# Patient Record
Sex: MALE | Race: WHITE | NOT HISPANIC OR LATINO | Employment: OTHER | URBAN - METROPOLITAN AREA
[De-identification: names, ages, dates, MRNs, and addresses within clinical notes are randomized per-mention and may not be internally consistent; named-entity substitution may affect disease eponyms.]

---

## 2021-11-23 ENCOUNTER — HOSPITAL ENCOUNTER (INPATIENT)
Facility: CLINIC | Age: 86
LOS: 2 days | Discharge: HOME | DRG: 137 | End: 2021-11-25
Attending: EMERGENCY MEDICINE | Admitting: STUDENT IN AN ORGANIZED HEALTH CARE EDUCATION/TRAINING PROGRAM
Payer: COMMERCIAL

## 2021-11-23 DIAGNOSIS — L02.11 ABSCESS OF NECK: Primary | ICD-10-CM

## 2021-11-23 DIAGNOSIS — Z20.822 COVID-19 RULED OUT BY LABORATORY TESTING: ICD-10-CM

## 2021-11-23 DIAGNOSIS — R22.0 MOUTH SWELLING: ICD-10-CM

## 2021-11-23 DIAGNOSIS — R22.0 MANDIBULAR SWELLING: ICD-10-CM

## 2021-11-23 LAB
ALBUMIN SERPL-MCNC: 3 G/DL (ref 3.4–5)
ALP SERPL-CCNC: 68 U/L (ref 40–150)
ALT SERPL W P-5'-P-CCNC: 10 U/L (ref 0–70)
ANION GAP SERPL CALCULATED.3IONS-SCNC: 9 MMOL/L (ref 3–14)
AST SERPL W P-5'-P-CCNC: 12 U/L (ref 0–45)
BASOPHILS # BLD AUTO: 0.1 10E3/UL (ref 0–0.2)
BASOPHILS NFR BLD AUTO: 1 %
BILIRUB SERPL-MCNC: 0.7 MG/DL (ref 0.2–1.3)
BUN SERPL-MCNC: 23 MG/DL (ref 7–30)
CALCIUM SERPL-MCNC: 9.2 MG/DL (ref 8.5–10.1)
CHLORIDE BLD-SCNC: 103 MMOL/L (ref 94–109)
CO2 SERPL-SCNC: 25 MMOL/L (ref 20–32)
CREAT SERPL-MCNC: 0.93 MG/DL (ref 0.66–1.25)
EOSINOPHIL # BLD AUTO: 0.1 10E3/UL (ref 0–0.7)
EOSINOPHIL NFR BLD AUTO: 1 %
ERYTHROCYTE [DISTWIDTH] IN BLOOD BY AUTOMATED COUNT: 13.6 % (ref 10–15)
GFR SERPL CREATININE-BSD FRML MDRD: 71 ML/MIN/1.73M2
GLUCOSE BLD-MCNC: 196 MG/DL (ref 70–99)
GLUCOSE BLDC GLUCOMTR-MCNC: 156 MG/DL (ref 70–99)
HBA1C MFR BLD: 6.7 % (ref 0–5.6)
HCT VFR BLD AUTO: 33.9 % (ref 40–53)
HGB BLD-MCNC: 11.1 G/DL (ref 13.3–17.7)
IMM GRANULOCYTES # BLD: 0.1 10E3/UL
IMM GRANULOCYTES NFR BLD: 1 %
INR PPP: 2.57 (ref 0.85–1.15)
LYMPHOCYTES # BLD AUTO: 1 10E3/UL (ref 0.8–5.3)
LYMPHOCYTES NFR BLD AUTO: 7 %
MCH RBC QN AUTO: 30.2 PG (ref 26.5–33)
MCHC RBC AUTO-ENTMCNC: 32.7 G/DL (ref 31.5–36.5)
MCV RBC AUTO: 92 FL (ref 78–100)
MONOCYTES # BLD AUTO: 1 10E3/UL (ref 0–1.3)
MONOCYTES NFR BLD AUTO: 7 %
NEUTROPHILS # BLD AUTO: 11.8 10E3/UL (ref 1.6–8.3)
NEUTROPHILS NFR BLD AUTO: 83 %
NRBC # BLD AUTO: 0 10E3/UL
NRBC BLD AUTO-RTO: 0 /100
PLATELET # BLD AUTO: 353 10E3/UL (ref 150–450)
POTASSIUM BLD-SCNC: 4.2 MMOL/L (ref 3.4–5.3)
PROT SERPL-MCNC: 7.1 G/DL (ref 6.8–8.8)
RBC # BLD AUTO: 3.68 10E6/UL (ref 4.4–5.9)
SARS-COV-2 RNA RESP QL NAA+PROBE: NEGATIVE
SODIUM SERPL-SCNC: 137 MMOL/L (ref 133–144)
WBC # BLD AUTO: 14.1 10E3/UL (ref 4–11)

## 2021-11-23 PROCEDURE — 82040 ASSAY OF SERUM ALBUMIN: CPT

## 2021-11-23 PROCEDURE — 36415 COLL VENOUS BLD VENIPUNCTURE: CPT

## 2021-11-23 PROCEDURE — 99221 1ST HOSP IP/OBS SF/LOW 40: CPT | Mod: AI | Performed by: STUDENT IN AN ORGANIZED HEALTH CARE EDUCATION/TRAINING PROGRAM

## 2021-11-23 PROCEDURE — C9803 HOPD COVID-19 SPEC COLLECT: HCPCS | Performed by: EMERGENCY MEDICINE

## 2021-11-23 PROCEDURE — 250N000011 HC RX IP 250 OP 636

## 2021-11-23 PROCEDURE — 85610 PROTHROMBIN TIME: CPT

## 2021-11-23 PROCEDURE — 96376 TX/PRO/DX INJ SAME DRUG ADON: CPT | Performed by: EMERGENCY MEDICINE

## 2021-11-23 PROCEDURE — 120N000002 HC R&B MED SURG/OB UMMC

## 2021-11-23 PROCEDURE — 99285 EMERGENCY DEPT VISIT HI MDM: CPT | Mod: 25 | Performed by: EMERGENCY MEDICINE

## 2021-11-23 PROCEDURE — 85025 COMPLETE CBC W/AUTO DIFF WBC: CPT

## 2021-11-23 PROCEDURE — 99285 EMERGENCY DEPT VISIT HI MDM: CPT | Performed by: EMERGENCY MEDICINE

## 2021-11-23 PROCEDURE — U0005 INFEC AGEN DETEC AMPLI PROBE: HCPCS | Performed by: EMERGENCY MEDICINE

## 2021-11-23 PROCEDURE — 83036 HEMOGLOBIN GLYCOSYLATED A1C: CPT

## 2021-11-23 PROCEDURE — 87040 BLOOD CULTURE FOR BACTERIA: CPT

## 2021-11-23 PROCEDURE — 99207 PR CDG-HISTORY COMP: MEETS EXP. PROBLEM FOCUSED-DOWN CODED LACK OF ROS: CPT | Performed by: STUDENT IN AN ORGANIZED HEALTH CARE EDUCATION/TRAINING PROGRAM

## 2021-11-23 RX ORDER — POLYETHYLENE GLYCOL 3350 17 G/17G
17 POWDER, FOR SOLUTION ORAL DAILY PRN
Status: DISCONTINUED | OUTPATIENT
Start: 2021-11-23 | End: 2021-11-25 | Stop reason: HOSPADM

## 2021-11-23 RX ORDER — ASPIRIN 81 MG/1
81 TABLET ORAL DAILY
COMMUNITY

## 2021-11-23 RX ORDER — AMOXICILLIN 250 MG
1 CAPSULE ORAL 2 TIMES DAILY PRN
Status: DISCONTINUED | OUTPATIENT
Start: 2021-11-23 | End: 2021-11-25 | Stop reason: HOSPADM

## 2021-11-23 RX ORDER — AMPICILLIN AND SULBACTAM 2; 1 G/1; G/1
3 INJECTION, POWDER, FOR SOLUTION INTRAMUSCULAR; INTRAVENOUS EVERY 6 HOURS
Status: DISCONTINUED | OUTPATIENT
Start: 2021-11-23 | End: 2021-11-25

## 2021-11-23 RX ORDER — WARFARIN SODIUM 5 MG/1
TABLET ORAL
COMMUNITY
Start: 2021-09-07

## 2021-11-23 RX ORDER — ACETAMINOPHEN 325 MG/1
650 TABLET ORAL EVERY 6 HOURS PRN
Status: DISCONTINUED | OUTPATIENT
Start: 2021-11-23 | End: 2021-11-25 | Stop reason: HOSPADM

## 2021-11-23 RX ORDER — METOPROLOL SUCCINATE 50 MG/1
50 TABLET, EXTENDED RELEASE ORAL DAILY
Status: DISCONTINUED | OUTPATIENT
Start: 2021-11-24 | End: 2021-11-25 | Stop reason: HOSPADM

## 2021-11-23 RX ORDER — METOPROLOL SUCCINATE 100 MG/1
50 TABLET, EXTENDED RELEASE ORAL DAILY
COMMUNITY
Start: 2021-11-02

## 2021-11-23 RX ORDER — NICOTINE POLACRILEX 4 MG
15-30 LOZENGE BUCCAL
Status: DISCONTINUED | OUTPATIENT
Start: 2021-11-23 | End: 2021-11-25 | Stop reason: HOSPADM

## 2021-11-23 RX ORDER — SIMVASTATIN 40 MG
TABLET ORAL
COMMUNITY
Start: 2021-10-11

## 2021-11-23 RX ORDER — LIDOCAINE 40 MG/G
CREAM TOPICAL
Status: DISCONTINUED | OUTPATIENT
Start: 2021-11-23 | End: 2021-11-25 | Stop reason: HOSPADM

## 2021-11-23 RX ORDER — SIMVASTATIN 10 MG
40 TABLET ORAL AT BEDTIME
Status: DISCONTINUED | OUTPATIENT
Start: 2021-11-23 | End: 2021-11-25 | Stop reason: HOSPADM

## 2021-11-23 RX ORDER — AMOXICILLIN 250 MG
2 CAPSULE ORAL 2 TIMES DAILY PRN
Status: DISCONTINUED | OUTPATIENT
Start: 2021-11-23 | End: 2021-11-25 | Stop reason: HOSPADM

## 2021-11-23 RX ORDER — DEXTROSE MONOHYDRATE 25 G/50ML
25-50 INJECTION, SOLUTION INTRAVENOUS
Status: DISCONTINUED | OUTPATIENT
Start: 2021-11-23 | End: 2021-11-25 | Stop reason: HOSPADM

## 2021-11-23 RX ORDER — GLYBURIDE 5 MG/1
TABLET ORAL
COMMUNITY
Start: 2021-08-23

## 2021-11-23 RX ADMIN — AMPICILLIN SODIUM AND SULBACTAM SODIUM 3 G: 2; 1 INJECTION, POWDER, FOR SOLUTION INTRAMUSCULAR; INTRAVENOUS at 20:19

## 2021-11-23 ASSESSMENT — ACTIVITIES OF DAILY LIVING (ADL)
ADLS_ACUITY_SCORE: 9

## 2021-11-23 ASSESSMENT — ENCOUNTER SYMPTOMS
FEVER: 0
TROUBLE SWALLOWING: 0
SHORTNESS OF BREATH: 0

## 2021-11-23 NOTE — ED PROVIDER NOTES
[place] EMERGENCY DEPARTMENT ([facility])  11/23/21  History     Chief Complaint   Patient presents with     Facial Swelling     The history is provided by the patient and medical records.     Frank Doss is a 89 year old male with a past medical history significant for type 2 diabetes mellitus, paroxysmal atrial fibrillation (anticoagulated on Coumadin), CAD (s/p CABG x3-11/1999, s/p PTCA/stent of the RCA-2/2013), hyperlipidemia, hypertension, and prostate cancer who presents to the Emergency Department via transfer from [facility] for evaluation of submandibular oral swelling with subsequent imaging suspicious for mandibular osteomyelitis.  Patient was initially evaluated at [facility] ED earlier today (11/23) for ongoing submandibular swelling.  This visit is summarized below.  Upon ED presentation, patient reports that the swelling has been ongoing for approximately 1 month.  He denies any difficulty with breathing or swallowing.  Patient states that the area of swelling is painful to touch.  Patient denies fevers here in the ED.    Per review of the patient's medical record, he was initially evaluated earlier today at [facility] ED. He subsequently underwent a CT scan of the neck which was concerning for mandibular osteomyelitis with adjacent soft tissue swelling.  Full impression noted below.  Patient was subsequently transferred to the [place] ED for oral surgery evaluation.      Past Medical History  No past medical history on file.  No past surgical history on file.  aspirin 81 MG EC tablet  glyBURIDE (DIABETA /MICRONASE) 5 MG tablet  metFORMIN (GLUCOPHAGE) 500 MG tablet  metoprolol succinate ER (TOPROL-XL) 100 MG 24 hr tablet  simvastatin (ZOCOR) 40 MG tablet  vitamin B-12 (CYANOCOBALAMIN) 1000 MCG tablet  warfarin ANTICOAGULANT (COUMADIN) 5 MG tablet      No Known Allergies  Family History  No family history on file.  Social History   Social History     Tobacco Use     Smoking status: Not on file      Smokeless tobacco: Not on file   Substance Use Topics     Alcohol use: Not on file     Drug use: Not on file      Past medical history, past surgical history, medications, allergies, family history, and social history were reviewed with the patient. No additional pertinent items.     I have reviewed the Medications, Allergies, Past Medical and Surgical History, and Social History in the Epic system.    Review of Systems   Constitutional: Negative for fever.   HENT: Positive for dental problem (Swelling under tongue). Negative for trouble swallowing.    Respiratory: Negative for shortness of breath.    All other systems reviewed and are negative.      Physical Exam   BP: (!) 184/76  Pulse: 84  Temp: 98.5  F (36.9  C)  Resp: 18  Weight: 68 kg (150 lb)  SpO2: 95 %      Physical Exam  Vitals and nursing note reviewed.   Constitutional:       General: He is not in acute distress.     Appearance: Normal appearance. He is well-developed. He is not diaphoretic.   HENT:      Head: Normocephalic and atraumatic.      Nose: Nose normal.      Mouth/Throat:      Mouth: Mucous membranes are moist.      Pharynx: Oropharynx is clear. No oropharyngeal exudate.      Comments: Sublingual edema with mild tongue proptosis. Submental fullness and tenderness w/o increased warmth or redness.   Eyes:      General: No scleral icterus.     Conjunctiva/sclera: Conjunctivae normal.   Neck:      Trachea: Trachea and phonation normal.     Cardiovascular:      Rate and Rhythm: Normal rate.   Pulmonary:      Effort: Pulmonary effort is normal. No respiratory distress.      Breath sounds: No stridor.   Abdominal:      General: There is no distension.   Musculoskeletal:         General: No deformity or signs of injury. Normal range of motion.      Cervical back: Normal range of motion and neck supple. Tenderness present. No rigidity or crepitus. No pain with movement. Normal range of motion.   Lymphadenopathy:      Cervical: Cervical adenopathy  present.   Skin:     General: Skin is warm and dry.      Coloration: Skin is not jaundiced or pale.      Findings: No rash.   Neurological:      General: No focal deficit present.      Mental Status: He is alert and oriented to person, place, and time.   Psychiatric:         Mood and Affect: Mood normal.         Behavior: Behavior normal.         Thought Content: Thought content normal.         ED Course     At 1:12 PM the patient was seen and examined by Neda Ramirez MD in Room EDW.    CT neck soft tissue ([facility])-11/23/2021  IMPRESSION:   1. Midline posterior mandibular symphyseal lesion with lytic bone destruction, extension to the periodontal spaces as above, adjacent soft tissue swelling and evidence of bone sequestrum. 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 Moderate thickening of the digastric musculature bilaterally right side more so than left and evidence of phlegmon or very early, small intramuscular abscess involving the right digastric muscle. No airway compromise. Reactive adenopathy.      Procedures              Results for orders placed or performed during the hospital encounter of 11/23/21 (from the past 24 hour(s))   Glucose by meter   Result Value Ref Range    GLUCOSE BY METER POCT 156 (H) 70 - 99 mg/dL   Asymptomatic COVID-19 Virus (Coronavirus) by PCR Nasopharyngeal    Specimen: Nasopharyngeal; Swab   Result Value Ref Range    SARS CoV2 PCR Negative Negative, Testing sent to reference lab. Results will be returned via unsolicited result    Narrative    Testing was performed using the Xpert Xpress SARS-CoV-2 Assay on the  Cepheid Gene-Xpert Instrument Systems. Additional information about  this Emergency Use Authorization (EUA) assay can be found via the Lab  Guide. This test should be ordered for the detection of SARS-CoV-2 in  individuals who meet SARS-CoV-2 clinical and/or epidemiological  criteria. Test performance is unknown in  asymptomatic patients. This  test is for in vitro diagnostic use under the FDA EUA for  laboratories certified under CLIA to perform high complexity testing.  This test has not been FDA cleared or approved. A negative result  does not rule out the presence of PCR inhibitors in the specimen or  target RNA in concentration below the limit of detection for the  assay. The possibility of a false negative should be considered if  the patient's recent exposure or clinical presentation suggests  COVID-19. This test was validated by the [facility]. This laboratory is certified under  the Clinical Laboratory Improvement Amendments of 1988 (CLIA-88) as  qualified to perform high complexity laboratory testing.       Medications   ampicillin-sulbactam (UNASYN) 3 g vial to attach to  mL bag (has no administration in time range)   lidocaine 1 % 0.1-1 mL (has no administration in time range)   lidocaine (LMX4) cream (has no administration in time range)   sodium chloride (PF) 0.9% PF flush 3 mL (has no administration in time range)   sodium chloride (PF) 0.9% PF flush 3 mL (has no administration in time range)   melatonin tablet 1 mg (has no administration in time range)   acetaminophen (TYLENOL) tablet 650 mg (has no administration in time range)   senna-docusate (SENOKOT-S/PERICOLACE) 8.6-50 MG per tablet 1 tablet (has no administration in time range)     Or   senna-docusate (SENOKOT-S/PERICOLACE) 8.6-50 MG per tablet 2 tablet (has no administration in time range)   polyethylene glycol (MIRALAX) Packet 17 g (has no administration in time range)          Assessments & Plan (with Medical Decision Making)   Frank Doss is a 89 year old male with a past medical history significant for type 2 diabetes mellitus, paroxysmal atrial fibrillation (anticoagulated on Coumadin), CAD (s/p CABG x3-11/1999, s/p PTCA/stent of the RCA-2/2013), hyperlipidemia, hypertension, and prostate  cancer who presents to the Emergency Department via transfer from [facility] for evaluation of submandibular oral swelling with subsequent imaging suspicious for mandibular osteomyelitis.    Ddx: neck soft tissue phlegmon, mandibular osteomyelitis, ludwigs angina, malignancy    Patient well appearing on arrival. No airway obstruction. Denies pain. Reviewed OSH labs and imaging. WBC 13.1, hgb 10.9. BMP wnl. Oral surgery consulted and recommended admission to medicine. Continue Unasyn which patient got a dose of PTA. NPO at midnight with plan for OR in AM. Patient admitted to medicine.      I have reviewed the nursing notes.    I have reviewed the findings, diagnosis, plan and need for follow up with the patient.    New Prescriptions    No medications on file       Final diagnoses:   Mouth swelling       I, Rodrigo Becerril am serving as a trained medical scribe to document services personally performed by Neda Ramirez MD, based on the provider's statements to me.      INeda MD, was physically present and have reviewed and verified the accuracy of this note documented by Rodrigo Becerril.     Neda Ramirez MD  11/23/2021   [facility] EMERGENCY DEPARTMENT     Neda Ramirez MD  11/23/21 1949

## 2021-11-23 NOTE — ED NOTES
Bed: IN03  Expected date: 11/23/21  Expected time:   Means of arrival:   Comments:  Frank Douglassswell from [facility] ER with submandibular and jaw swelling. Airway stable. Vss CT with mandibular osteomyellitis and dental and peridental involvement with ? Early abscess right digastric muscle. Needs OMFS to see     Mejia Cm MD  11/23/21 [id number]

## 2021-11-23 NOTE — ED NOTES
Bed: [facility]  Expected date:   Expected time:   Means of arrival:   Comments:  CIARA [id number]  [facility] transfer  Mandibular / Mandible swelling; patent airway

## 2021-11-23 NOTE — ED TRIAGE NOTES
Arrives from [facility] via EMS. EMS states jaw and throat swelling. Non-tramatic. Ems unsure of mechanism.       20 right hand.     VSS en route. HR vamsi.     Antibiotics at 1030 this am.

## 2021-11-24 ENCOUNTER — ANESTHESIA EVENT (OUTPATIENT)
Dept: SURGERY | Facility: CLINIC | Age: 86
DRG: 137 | End: 2021-11-24
Payer: COMMERCIAL

## 2021-11-24 ENCOUNTER — ANESTHESIA (OUTPATIENT)
Dept: SURGERY | Facility: CLINIC | Age: 86
DRG: 137 | End: 2021-11-24
Payer: COMMERCIAL

## 2021-11-24 LAB
ALBUMIN SERPL-MCNC: 2.7 G/DL (ref 3.4–5)
ALP SERPL-CCNC: 64 U/L (ref 40–150)
ALT SERPL W P-5'-P-CCNC: 10 U/L (ref 0–70)
ANION GAP SERPL CALCULATED.3IONS-SCNC: 6 MMOL/L (ref 3–14)
AST SERPL W P-5'-P-CCNC: 8 U/L (ref 0–45)
ATRIAL RATE - MUSE: 61 BPM
BILIRUB SERPL-MCNC: 0.8 MG/DL (ref 0.2–1.3)
BUN SERPL-MCNC: 20 MG/DL (ref 7–30)
CALCIUM SERPL-MCNC: 9.2 MG/DL (ref 8.5–10.1)
CHLORIDE BLD-SCNC: 105 MMOL/L (ref 94–109)
CO2 SERPL-SCNC: 27 MMOL/L (ref 20–32)
CREAT SERPL-MCNC: 0.89 MG/DL (ref 0.66–1.25)
DIASTOLIC BLOOD PRESSURE - MUSE: NORMAL MMHG
ERYTHROCYTE [DISTWIDTH] IN BLOOD BY AUTOMATED COUNT: 13.6 % (ref 10–15)
GFR SERPL CREATININE-BSD FRML MDRD: 74 ML/MIN/1.73M2
GLUCOSE BLD-MCNC: 182 MG/DL (ref 70–99)
GLUCOSE BLDC GLUCOMTR-MCNC: 120 MG/DL (ref 70–99)
GLUCOSE BLDC GLUCOMTR-MCNC: 138 MG/DL (ref 70–99)
GLUCOSE BLDC GLUCOMTR-MCNC: 165 MG/DL (ref 70–99)
GLUCOSE BLDC GLUCOMTR-MCNC: 183 MG/DL (ref 70–99)
GLUCOSE BLDC GLUCOMTR-MCNC: 190 MG/DL (ref 70–99)
HCT VFR BLD AUTO: 32.2 % (ref 40–53)
HGB BLD-MCNC: 10.6 G/DL (ref 13.3–17.7)
INR PPP: 2.83 (ref 0.85–1.15)
INR PPP: 2.98 (ref 0.85–1.15)
INTERPRETATION ECG - MUSE: NORMAL
MCH RBC QN AUTO: 30.4 PG (ref 26.5–33)
MCHC RBC AUTO-ENTMCNC: 32.9 G/DL (ref 31.5–36.5)
MCV RBC AUTO: 92 FL (ref 78–100)
P AXIS - MUSE: NORMAL DEGREES
PLATELET # BLD AUTO: 340 10E3/UL (ref 150–450)
POTASSIUM BLD-SCNC: 4 MMOL/L (ref 3.4–5.3)
PR INTERVAL - MUSE: NORMAL MS
PROT SERPL-MCNC: 6.6 G/DL (ref 6.8–8.8)
QRS DURATION - MUSE: 126 MS
QT - MUSE: 442 MS
QTC - MUSE: 500 MS
R AXIS - MUSE: 22 DEGREES
RBC # BLD AUTO: 3.49 10E6/UL (ref 4.4–5.9)
SODIUM SERPL-SCNC: 138 MMOL/L (ref 133–144)
SYSTOLIC BLOOD PRESSURE - MUSE: NORMAL MMHG
T AXIS - MUSE: -37 DEGREES
VENTRICULAR RATE- MUSE: 77 BPM
WBC # BLD AUTO: 11.2 10E3/UL (ref 4–11)

## 2021-11-24 PROCEDURE — 999N000141 HC STATISTIC PRE-PROCEDURE NURSING ASSESSMENT: Performed by: DENTIST

## 2021-11-24 PROCEDURE — 36415 COLL VENOUS BLD VENIPUNCTURE: CPT

## 2021-11-24 PROCEDURE — 250N000011 HC RX IP 250 OP 636

## 2021-11-24 PROCEDURE — 0NBT0ZX EXCISION OF RIGHT MANDIBLE, OPEN APPROACH, DIAGNOSTIC: ICD-10-PCS | Performed by: DENTIST

## 2021-11-24 PROCEDURE — 87070 CULTURE OTHR SPECIMN AEROBIC: CPT | Performed by: DENTIST

## 2021-11-24 PROCEDURE — 96365 THER/PROPH/DIAG IV INF INIT: CPT | Performed by: EMERGENCY MEDICINE

## 2021-11-24 PROCEDURE — 87075 CULTR BACTERIA EXCEPT BLOOD: CPT | Performed by: DENTIST

## 2021-11-24 PROCEDURE — 250N000011 HC RX IP 250 OP 636: Performed by: ANESTHESIOLOGY

## 2021-11-24 PROCEDURE — 999N000147 HC STATISTIC PT IP EVAL DEFER

## 2021-11-24 PROCEDURE — 710N000010 HC RECOVERY PHASE 1, LEVEL 2, PER MIN: Performed by: DENTIST

## 2021-11-24 PROCEDURE — 88305 TISSUE EXAM BY PATHOLOGIST: CPT | Mod: 26 | Performed by: PATHOLOGY

## 2021-11-24 PROCEDURE — 250N000025 HC SEVOFLURANE, PER MIN: Performed by: DENTIST

## 2021-11-24 PROCEDURE — 258N000003 HC RX IP 258 OP 636: Performed by: ANESTHESIOLOGY

## 2021-11-24 PROCEDURE — 85610 PROTHROMBIN TIME: CPT | Performed by: STUDENT IN AN ORGANIZED HEALTH CARE EDUCATION/TRAINING PROGRAM

## 2021-11-24 PROCEDURE — 360N000075 HC SURGERY LEVEL 2, PER MIN: Performed by: DENTIST

## 2021-11-24 PROCEDURE — 85027 COMPLETE CBC AUTOMATED: CPT

## 2021-11-24 PROCEDURE — 370N000017 HC ANESTHESIA TECHNICAL FEE, PER MIN: Performed by: DENTIST

## 2021-11-24 PROCEDURE — 99233 SBSQ HOSP IP/OBS HIGH 50: CPT | Mod: GC | Performed by: STUDENT IN AN ORGANIZED HEALTH CARE EDUCATION/TRAINING PROGRAM

## 2021-11-24 PROCEDURE — 250N000009 HC RX 250: Performed by: ANESTHESIOLOGY

## 2021-11-24 PROCEDURE — 85610 PROTHROMBIN TIME: CPT

## 2021-11-24 PROCEDURE — 272N000001 HC OR GENERAL SUPPLY STERILE: Performed by: DENTIST

## 2021-11-24 PROCEDURE — 82040 ASSAY OF SERUM ALBUMIN: CPT

## 2021-11-24 PROCEDURE — 88305 TISSUE EXAM BY PATHOLOGIST: CPT | Mod: TC | Performed by: DENTIST

## 2021-11-24 PROCEDURE — 0CTX0Z1 RESECTION OF LOWER TOOTH, MULTIPLE, OPEN APPROACH: ICD-10-PCS | Performed by: DENTIST

## 2021-11-24 PROCEDURE — 99207 PR CDG-CUT & PASTE-POTENTIAL IMPACT ON LEVEL: CPT | Performed by: STUDENT IN AN ORGANIZED HEALTH CARE EDUCATION/TRAINING PROGRAM

## 2021-11-24 PROCEDURE — 250N000009 HC RX 250: Performed by: DENTIST

## 2021-11-24 PROCEDURE — 0NBV0ZX EXCISION OF LEFT MANDIBLE, OPEN APPROACH, DIAGNOSTIC: ICD-10-PCS | Performed by: DENTIST

## 2021-11-24 PROCEDURE — 36415 COLL VENOUS BLD VENIPUNCTURE: CPT | Performed by: STUDENT IN AN ORGANIZED HEALTH CARE EDUCATION/TRAINING PROGRAM

## 2021-11-24 PROCEDURE — 120N000002 HC R&B MED SURG/OB UMMC

## 2021-11-24 PROCEDURE — 0W930ZZ DRAINAGE OF ORAL CAVITY AND THROAT, OPEN APPROACH: ICD-10-PCS | Performed by: DENTIST

## 2021-11-24 PROCEDURE — 87076 CULTURE ANAEROBE IDENT EACH: CPT | Performed by: DENTIST

## 2021-11-24 PROCEDURE — 87205 SMEAR GRAM STAIN: CPT | Performed by: DENTIST

## 2021-11-24 PROCEDURE — 250N000012 HC RX MED GY IP 250 OP 636 PS 637

## 2021-11-24 PROCEDURE — 999N000111 HC STATISTIC OT IP EVAL DEFER

## 2021-11-24 PROCEDURE — 250N000013 HC RX MED GY IP 250 OP 250 PS 637: Performed by: STUDENT IN AN ORGANIZED HEALTH CARE EDUCATION/TRAINING PROGRAM

## 2021-11-24 PROCEDURE — 250N000013 HC RX MED GY IP 250 OP 250 PS 637

## 2021-11-24 PROCEDURE — 96366 THER/PROPH/DIAG IV INF ADDON: CPT | Performed by: EMERGENCY MEDICINE

## 2021-11-24 RX ORDER — ONDANSETRON 2 MG/ML
4 INJECTION INTRAMUSCULAR; INTRAVENOUS EVERY 30 MIN PRN
Status: DISCONTINUED | OUTPATIENT
Start: 2021-11-24 | End: 2021-11-24 | Stop reason: HOSPADM

## 2021-11-24 RX ORDER — OXYCODONE HYDROCHLORIDE 5 MG/1
5 TABLET ORAL EVERY 4 HOURS PRN
Status: CANCELLED | OUTPATIENT
Start: 2021-11-24

## 2021-11-24 RX ORDER — ONDANSETRON 2 MG/ML
INJECTION INTRAMUSCULAR; INTRAVENOUS PRN
Status: DISCONTINUED | OUTPATIENT
Start: 2021-11-24 | End: 2021-11-24

## 2021-11-24 RX ORDER — EPHEDRINE SULFATE 50 MG/ML
INJECTION, SOLUTION INTRAMUSCULAR; INTRAVENOUS; SUBCUTANEOUS PRN
Status: DISCONTINUED | OUTPATIENT
Start: 2021-11-24 | End: 2021-11-24

## 2021-11-24 RX ORDER — ONDANSETRON 4 MG/1
4 TABLET, ORALLY DISINTEGRATING ORAL EVERY 30 MIN PRN
Status: DISCONTINUED | OUTPATIENT
Start: 2021-11-24 | End: 2021-11-24 | Stop reason: HOSPADM

## 2021-11-24 RX ORDER — FENTANYL CITRATE 50 UG/ML
INJECTION, SOLUTION INTRAMUSCULAR; INTRAVENOUS PRN
Status: DISCONTINUED | OUTPATIENT
Start: 2021-11-24 | End: 2021-11-24

## 2021-11-24 RX ORDER — DEXAMETHASONE SODIUM PHOSPHATE 10 MG/ML
INJECTION, SOLUTION INTRAMUSCULAR; INTRAVENOUS PRN
Status: DISCONTINUED | OUTPATIENT
Start: 2021-11-24 | End: 2021-11-24

## 2021-11-24 RX ORDER — SODIUM CHLORIDE, SODIUM LACTATE, POTASSIUM CHLORIDE, CALCIUM CHLORIDE 600; 310; 30; 20 MG/100ML; MG/100ML; MG/100ML; MG/100ML
INJECTION, SOLUTION INTRAVENOUS CONTINUOUS PRN
Status: DISCONTINUED | OUTPATIENT
Start: 2021-11-24 | End: 2021-11-24

## 2021-11-24 RX ORDER — HYDRALAZINE HYDROCHLORIDE 20 MG/ML
10 INJECTION INTRAMUSCULAR; INTRAVENOUS ONCE
Status: COMPLETED | OUTPATIENT
Start: 2021-11-24 | End: 2021-11-24

## 2021-11-24 RX ORDER — ALBUTEROL SULFATE 0.83 MG/ML
2.5 SOLUTION RESPIRATORY (INHALATION) EVERY 4 HOURS PRN
Status: DISCONTINUED | OUTPATIENT
Start: 2021-11-24 | End: 2021-11-24 | Stop reason: HOSPADM

## 2021-11-24 RX ORDER — HYDROMORPHONE HYDROCHLORIDE 1 MG/ML
0.2 INJECTION, SOLUTION INTRAMUSCULAR; INTRAVENOUS; SUBCUTANEOUS EVERY 5 MIN PRN
Status: DISCONTINUED | OUTPATIENT
Start: 2021-11-24 | End: 2021-11-24 | Stop reason: HOSPADM

## 2021-11-24 RX ORDER — LIDOCAINE HYDROCHLORIDE 10 MG/ML
INJECTION, SOLUTION INFILTRATION; PERINEURAL PRN
Status: DISCONTINUED | OUTPATIENT
Start: 2021-11-24 | End: 2021-11-24

## 2021-11-24 RX ORDER — LORAZEPAM 2 MG/ML
.5-1 INJECTION INTRAMUSCULAR
Status: DISCONTINUED | OUTPATIENT
Start: 2021-11-24 | End: 2021-11-24 | Stop reason: HOSPADM

## 2021-11-24 RX ORDER — HALOPERIDOL 5 MG/ML
1 INJECTION INTRAMUSCULAR
Status: COMPLETED | OUTPATIENT
Start: 2021-11-24 | End: 2021-11-24

## 2021-11-24 RX ORDER — FENTANYL CITRATE 50 UG/ML
50 INJECTION, SOLUTION INTRAMUSCULAR; INTRAVENOUS EVERY 5 MIN PRN
Status: DISCONTINUED | OUTPATIENT
Start: 2021-11-24 | End: 2021-11-24 | Stop reason: HOSPADM

## 2021-11-24 RX ORDER — HYDROXYZINE HYDROCHLORIDE 10 MG/1
10 TABLET, FILM COATED ORAL EVERY 6 HOURS PRN
Status: DISCONTINUED | OUTPATIENT
Start: 2021-11-24 | End: 2021-11-24 | Stop reason: HOSPADM

## 2021-11-24 RX ORDER — BUPIVACAINE HYDROCHLORIDE AND EPINEPHRINE 5; 5 MG/ML; UG/ML
INJECTION, SOLUTION EPIDURAL; INTRACAUDAL; PERINEURAL PRN
Status: DISCONTINUED | OUTPATIENT
Start: 2021-11-24 | End: 2021-11-24 | Stop reason: HOSPADM

## 2021-11-24 RX ORDER — DIMENHYDRINATE 50 MG/ML
25 INJECTION, SOLUTION INTRAMUSCULAR; INTRAVENOUS
Status: DISCONTINUED | OUTPATIENT
Start: 2021-11-24 | End: 2021-11-24 | Stop reason: HOSPADM

## 2021-11-24 RX ORDER — PROPOFOL 10 MG/ML
INJECTION, EMULSION INTRAVENOUS PRN
Status: DISCONTINUED | OUTPATIENT
Start: 2021-11-24 | End: 2021-11-24

## 2021-11-24 RX ORDER — SODIUM CHLORIDE, SODIUM LACTATE, POTASSIUM CHLORIDE, CALCIUM CHLORIDE 600; 310; 30; 20 MG/100ML; MG/100ML; MG/100ML; MG/100ML
INJECTION, SOLUTION INTRAVENOUS CONTINUOUS
Status: DISCONTINUED | OUTPATIENT
Start: 2021-11-24 | End: 2021-11-24 | Stop reason: HOSPADM

## 2021-11-24 RX ADMIN — HYDRALAZINE HYDROCHLORIDE 10 MG: 20 INJECTION INTRAMUSCULAR; INTRAVENOUS at 19:46

## 2021-11-24 RX ADMIN — METOPROLOL SUCCINATE 50 MG: 50 TABLET, EXTENDED RELEASE ORAL at 08:29

## 2021-11-24 RX ADMIN — DEXAMETHASONE SODIUM PHOSPHATE 4 MG: 10 INJECTION, SOLUTION INTRAMUSCULAR; INTRAVENOUS at 17:28

## 2021-11-24 RX ADMIN — Medication 5 MG: at 17:28

## 2021-11-24 RX ADMIN — LIDOCAINE HYDROCHLORIDE 100 MG: 10 INJECTION, SOLUTION INFILTRATION; PERINEURAL at 17:19

## 2021-11-24 RX ADMIN — INSULIN ASPART 1 UNITS: 100 INJECTION, SOLUTION INTRAVENOUS; SUBCUTANEOUS at 08:32

## 2021-11-24 RX ADMIN — FENTANYL CITRATE 100 MCG: 50 INJECTION, SOLUTION INTRAMUSCULAR; INTRAVENOUS at 17:20

## 2021-11-24 RX ADMIN — SIMVASTATIN 40 MG: 40 TABLET, FILM COATED ORAL at 01:02

## 2021-11-24 RX ADMIN — ROCURONIUM BROMIDE 50 MG: 50 INJECTION, SOLUTION INTRAVENOUS at 17:21

## 2021-11-24 RX ADMIN — SUGAMMADEX 200 MG: 100 INJECTION, SOLUTION INTRAVENOUS at 18:02

## 2021-11-24 RX ADMIN — SODIUM CHLORIDE, POTASSIUM CHLORIDE, SODIUM LACTATE AND CALCIUM CHLORIDE: 600; 310; 30; 20 INJECTION, SOLUTION INTRAVENOUS at 17:15

## 2021-11-24 RX ADMIN — HYDROMORPHONE HYDROCHLORIDE 0.2 MG: 1 INJECTION, SOLUTION INTRAMUSCULAR; INTRAVENOUS; SUBCUTANEOUS at 19:10

## 2021-11-24 RX ADMIN — HALOPERIDOL LACTATE 2.5 MG: 5 INJECTION, SOLUTION INTRAMUSCULAR at 18:37

## 2021-11-24 RX ADMIN — AMPICILLIN SODIUM AND SULBACTAM SODIUM 3 G: 2; 1 INJECTION, POWDER, FOR SOLUTION INTRAMUSCULAR; INTRAVENOUS at 12:55

## 2021-11-24 RX ADMIN — ONDANSETRON 4 MG: 2 INJECTION INTRAMUSCULAR; INTRAVENOUS at 17:28

## 2021-11-24 RX ADMIN — PROPOFOL 150 MG: 10 INJECTION, EMULSION INTRAVENOUS at 17:20

## 2021-11-24 RX ADMIN — AMPICILLIN SODIUM AND SULBACTAM SODIUM 3 G: 2; 1 INJECTION, POWDER, FOR SOLUTION INTRAMUSCULAR; INTRAVENOUS at 08:24

## 2021-11-24 RX ADMIN — AMPICILLIN SODIUM AND SULBACTAM SODIUM 3 G: 2; 1 INJECTION, POWDER, FOR SOLUTION INTRAMUSCULAR; INTRAVENOUS at 01:31

## 2021-11-24 ASSESSMENT — ACTIVITIES OF DAILY LIVING (ADL)
ADLS_ACUITY_SCORE: 18
EQUIPMENT_CURRENTLY_USED_AT_HOME: GRAB BAR, TOILET;SHOWER CHAIR
TOILETING_ISSUES: NO
PATIENT_/_FAMILY_COMMUNICATION_STYLE: SPOKEN LANGUAGE (ENGLISH OR BILINGUAL)
ADLS_ACUITY_SCORE: 9
PATIENT'S_PREFERRED_MEANS_OF_COMMUNICATION: ENGLISH SPEAKER WITH HEARING LOSS, NO SPEECH PROBLEMS.
ADLS_ACUITY_SCORE: 18
FALL_HISTORY_WITHIN_LAST_SIX_MONTHS: NO
ADLS_ACUITY_SCORE: 18
HEARING_DIFFICULTY_OR_DEAF: YES
ADLS_ACUITY_SCORE: 9
ADLS_ACUITY_SCORE: 9
VISION_MANAGEMENT: GLASSES
EATING/SWALLOWING: EATING;SWALLOWING LIQUIDS
ADLS_ACUITY_SCORE: 9
ADLS_ACUITY_SCORE: 18
DOING_ERRANDS_INDEPENDENTLY_DIFFICULTY: YES
ADLS_ACUITY_SCORE: 18
WALKING_OR_CLIMBING_STAIRS_DIFFICULTY: NO
ADLS_ACUITY_SCORE: 18
DESCRIBE_HEARING_LOSS: BILATERAL HEARING LOSS
ADLS_ACUITY_SCORE: 18
CONCENTRATING,_REMEMBERING_OR_MAKING_DECISIONS_DIFFICULTY: YES
WERE_AUXILIARY_AIDS_OFFERED?: YES
ADLS_ACUITY_SCORE: 10
DRESSING/BATHING_DIFFICULTY: NO
DIFFICULTY_COMMUNICATING: NO
ADLS_ACUITY_SCORE: 18
USE_OF_HEARING_ASSISTIVE_DEVICES: BILATERAL HEARING AIDS
ADLS_ACUITY_SCORE: 18
DIFFICULTY_EATING/SWALLOWING: YES
ADLS_ACUITY_SCORE: 18
WEAR_GLASSES_OR_BLIND: YES
ADLS_ACUITY_SCORE: 20
ADLS_ACUITY_SCORE: 18
ADLS_ACUITY_SCORE: 20
ADLS_ACUITY_SCORE: 18
ADLS_ACUITY_SCORE: 18

## 2021-11-24 ASSESSMENT — ENCOUNTER SYMPTOMS: DYSRHYTHMIAS: 1

## 2021-11-24 NOTE — PROGRESS NOTES
[facility]    Progress Note - [facility] 4 Service        Date of Admission:  11/23/2021    Assessment & Plan             Frank Doss is a 89 year old male admitted on 11/23/2021. He has a history of type 2 diabetes mellitus, paroxysmal atrial fibrillation (anticoagulated on Coumadin), CAD (s/p CABG x3-11/1999, s/p PTCA/stent of the RCA-2/2013), hyperlipidemia, hypertension, and prostate cancer who is admitted for management of submandibular swelling 2/2 abscess and osteomyelitis.      Today  - NPO - will have I&D of abscess tonight  - On Unasyn    # Submandibular abscess   # Concern for osteomyelitis  Patient presents with 1 month history of submandibular swelling and mild pain after dental work. Initially at [facility] ED and transferred here for further management. CT head showing possible submandibular osteomyelitis and soft tissue swelling with abscess. Presenting labs showing leukocytosis of 14.1. Patient is stable. No signs of fever or systemic infection.  - Started Unasyn   - Blood cx NGTD  - OMF consulted - plan for OR 5:30pm 11/24 - likely I&D and possible bone biopsy  - CBC, CMP, and INR ordered  - Acetaminophen PRN for pain  - Dysphagia screen      # Paroxysmal atrial fibrillation  On Warfarin for atrial fibrillation. No complaints of palpitation currently.  INR 3.0 - per OMF, no need for FFP prior to procedure.  - Telemetry   - Holding warfarin for procedure on 11/24  - Repeat INR ordered prior to procedure     # Type 2 Diabetes Mellitus  On metformin and glyburide at home. Presenting . Hemoglobin A1c in May 8.0.  - Medium sliding scale insulin   - Holding home metformin and glyburide  - Hemoglobin A1c pending  - Glucose monitoring with hypoglycemic protocol     # CAD (s/p CABG x3-11/1999, s/p PTCA/stent of the RCA-2/2013)  On ASA 81 mg at home.  - Holding ASA for procedure on 11/24     # HLD  - PTA simvastatin     # HTN  - PTA metoprolol ER     #  Anemia; chronic  Presenting hemoglobin 10.9. No evidence of bleeding  - CTM     Diet: NPO per Anesthesia Guidelines for Procedure/Surgery Except for: Meds    DVT Prophylaxis: PTA coumadin.  Currently held prior to procedure  Boyd Catheter: Not present  Fluids: none  Central Lines: None  Code Status: Full Code  - discussed at length with patient and son    Disposition Plan   Expected discharge: 11/27/2021   recommended to prior living arrangement once antibiotic plan established after I&D.     The patient's care was discussed with the Attending Physician, Dr. Cleve Frey.    Gilmar Steinberg MD  [address]  Securely message with the Vocera Web Console (learn more here)  Text page via [employer] Paging/Directory    Please see sign in/sign out for up to date coverage information    Clinically Significant Risk Factors Present on Admission             # Coagulation Defect: home medication list includes an anticoagulant medication  # Platelet Defect: home medication list includes an antiplatelet medication      ______________________________________________________________________    Interval History   NAEON.  Patient presented to hospital - see H&P.  Denies f/c, chest pain, SOB, abdominal pain, n/v/d, leg swelling.  Has no complaints, only mentions that over last month has had mild dysphagia.  Frustrated with having to be in the hospital.      Data reviewed today: I reviewed all medications, new labs and imaging results over the last 24 hours.    Physical Exam   Vital Signs: Temp: 97.9  F (36.6  C) Temp src: Oral BP: 133/47 Pulse: 82   Resp: 18 SpO2: 95 % O2 Device: None (Room air)    Weight: 150 lbs 0 oz  Constitutional: NAD, cooperative.  Sitting on side of bed without difficulty or distress.  HEENT: Sclera anicteric, Normal oropharynx without ulcers or exudate, MMM  - Poor dentition  - Large bulge noted submandibularly; midline.  Nontender upon palpation,  fluctuant mass.  CV: RRR, no murmurs, gallops or rubs. JVD normal   Respiratory: CTAB, no wheezing, crackles or rales. Non-labored  Abd: Soft, NT/ND, +bs, no HSM  Skin: No lesions or rashes over exposed areas, normal color, texture and turgor  Neuro: AAO x 3      Data   Recent Labs   Lab 11/24/21  1209 11/24/21  0643 11/24/21  0624 11/24/21  0134 11/23/21 2010   WBC  --   --  11.2*  --  14.1*   HGB  --   --  10.6*  --  11.1*   MCV  --   --  92  --  92   PLT  --   --  340  --  353   INR  --   --  2.98*  --  2.57*   NA  --   --  138  --  137   POTASSIUM  --   --  4.0  --  4.2   CHLORIDE  --   --  105  --  103   CO2  --   --  27  --  25   BUN  --   --  20  --  23   CR  --   --  0.89  --  0.93   ANIONGAP  --   --  6  --  9   VANITA  --   --  9.2  --  9.2   * 165* 182*   < > 196*   ALBUMIN  --   --  2.7*  --  3.0*   PROTTOTAL  --   --  6.6*  --  7.1   BILITOTAL  --   --  0.8  --  0.7   ALKPHOS  --   --  64  --  68   ALT  --   --  10  --  10   AST  --   --  8  --  12    < > = values in this interval not displayed.     No results found for this or any previous visit (from the past 24 hour(s)).

## 2021-11-24 NOTE — PLAN OF CARE
OT/5A: DEFER. OT orders received. PT observed pt func mobility and reported pt is IND and moving at baseline. Discussed I/ADL performance w/ pt and son w/ both reporting no concerns at discharge. OT to sign off at this time. Please consult again if new needs arise.

## 2021-11-24 NOTE — PLAN OF CARE
PT 5A: cancel/defer    PT order received. Pt encountered up IND in room. Reports ambulating down the hallway already and denies mobility concerns, reports mobilizing at baseline. PT to complete orders and sign off. Anticipate return to home once medically stable.

## 2021-11-24 NOTE — BRIEF OP NOTE
[facility]    Brief Operative Note    Pre-operative diagnosis: Infection of mandible [M27.2]  Post-operative diagnosis Same as pre-operative diagnosis    Procedure: Procedure(s):  IRRIGATION AND DEBRIDEMENT, MANDIBLE  EXTRACTION, TOOTH #24 and 25  Surgeon: Surgeon(s) and Role:     * Dimitry Caballero DDS - Primary     * Sher Ayon DDS - Resident - Assisting     * Tori Shields DDS - Resident - Assisting    Gigi Fox DDS - Resident - Assisting   Anesthesia: General   Estimated Blood Loss: 5cc    Drains: None  Specimens:   ID Type Source Tests Collected by Time Destination   1 : biopsy mandible floor of mouth Tissue Other SURGICAL PATHOLOGY EXAM Dimitry Caballero DDS 11/24/2021  5:50 PM    A : Aspiration mandible Tissue Mandible ANAEROBIC BACTERIAL CULTURE ROUTINE, GRAM STAIN, AEROBIC BACTERIAL CULTURE ROUTINE Dimitry Caballero DDS 11/24/2021  5:38 PM      Findings:   Evidence of infection: purulence.  Complications: None.  Implants: * No implants in log *   Specimen:  Anterior lingual mandible, Culture for gram, aerobic and anaerobic     Recs to Primary:   - Transition from IV Unasyn to PO Amoxicillin for 7 day course   - Chlorhexidine rinses    - OMFS to round on pt in AM  - Likely stable for discharge tomorrow 11/25 pending course   - Soft diet   - Firm gauze pressure for hemostasis   - Ice to face as needed for comfort   - OMFS to set up follow up after discharge   - Pain meds per primary

## 2021-11-24 NOTE — H&P
[facility]    History and Physical - FP Complete Service        Date of Admission:  11/23/2021    Assessment & Plan     Frank Doss is a 89 year old male admitted on 11/23/2021. He has a history of type 2 diabetes mellitus, paroxysmal atrial fibrillation (anticoagulated on Coumadin), CAD (s/p CABG x3-11/1999, s/p PTCA/stent of the RCA-2/2013), hyperlipidemia, hypertension, and prostate cancer who is admitted for management of submandibular swelling 2/2 abscess and osteomyelitis.     # Submandibular abscess and osteomyelitis  Patient presents with 1 month history of submandibular swelling and mild pain after dental work. Initially at [facility] ED and transferred here for further management. CT head showing submandibular osteomyelitis and soft tissue swelling with abscess. Presenting labs showing leukocytosis of 14.1. Patient is stable. No signs of fever or systemic infection.  - Started Unasyn   - Blood cx  - OMF consulted  - CBC, CMP, and INR ordered  - Acetaminophen PRN for pain  - Made NPO at midnight for I&D and possible biopsy/extractions  - Dysphagia screen ordered    # Paroxysmal atrial fibrillation  On Warfarin for atrial fibrillation. No complaints of palpitation currently.  - EKG ordered  - Telemetry ordered  - Holding warfarin for procedure on 11/24  - INR ordered    # Type 2 Diabetes Mellitus  On metformin and glyburide at home. Presenting . Hemoglobin A1c in May 8.0.  - Medium sliding scale insulin   - Holding home metformin and glyburide  - Hemoglobin A1c ordered  - Glucose monitoring with hypoglycemic protocol    # CAD (s/p CABG x3-11/1999, s/p PTCA/stent of the RCA-2/2013)  On ASA 81 mg at home.  - Holding ASA for procedure on 11/24    # HLD  - PTA simvastatin     # HTN  - PTA metoprolol ER    # Anemia; chronic  Presenting hemoglobin 10.9. No evidence of bleeding  - CTM    Diet: NPO per Anesthesia Guidelines for Procedure/Surgery Except for:  Meds    DVT Prophylaxis: Pneumatic Compression Devices  Boyd Catheter: Not present  Fluids: None  Central Lines: None  Code Status: Full Code      Disposition Plan   Expected discharge: recommended to prior living arrangement once submandibular abscess treated.    The patient's care was discussed with the Attending Physician, Dr. Flores.    Rupert Hines Jr., MD  Internal Medicine- PGY1  [facility]  Pager: [phone]  [facility]  Securely message with the Vocera Web Console (learn more here)  Text page via [facility] Paging/Directory    Please see sign in/sign out for up to date coverage information  ______________________________________________________________________    Chief Complaint   Jaw pain    History is obtained from the patient and electronic health record    History of Present Illness   Frank Doss is a 89 year old male admitted on 11/23/2021. He has a history of type 2 diabetes mellitus, paroxysmal atrial fibrillation (anticoagulated on Coumadin), CAD (s/p CABG x3-11/1999, s/p PTCA/stent of the RCA-2/2013), hyperlipidemia, hypertension, and prostate cancer who is admitted for management of submandibular swelling 2/2 abscess and osteomyelitis.     Patient was transferred from [facility] for evaluation of submandibular oral swelling with CT head showing suspicion of mandibular osteomyelitis and presence of abscess. Patient was initially evaluated at [facility] ED earlier today (11/23) for ongoing submandibular swelling. Patient reports that the swelling has been ongoing for approximately 1 month. Per review of the patient's medical record, he was initially evaluated earlier today at [facility] ED. He subsequently underwent a CT scan of the neck which was concerning for mandibular osteomyelitis with adjacent soft tissue swelling.      He denies any difficulty with breathing or swallowing, but has mild pain sometimes with  swallowing. He denies fevers, chills, sweats, chest pain, shortness of breath, abdominal or joint pain, diarrhea/constipation, or nausea/vomiting.    Review of Systems    The 10 point Review of Systems is negative other than noted in the HPI or here.     Past Medical History    I have reviewed this patient's medical history and updated it with pertinent information if needed.   No past medical history on file.     Past Surgical History   I have reviewed this patient's surgical history and updated it with pertinent information if needed.  No past surgical history on file.     Social History   I have reviewed this patient's social history and updated it with pertinent information if needed. Frank Doss      Prior to Admission Medications   Prior to Admission Medications   Prescriptions Last Dose Informant Patient Reported? Taking?   aspirin 81 MG EC tablet 11/22/2021 at Unknown time  Yes Yes   Sig: Take 81 mg by mouth daily   glyBURIDE (DIABETA /MICRONASE) 5 MG tablet 11/22/2021 at Unknown time  Yes Yes   Sig: Take 2 tablets (10 mg) by mouth once daily.   metFORMIN (GLUCOPHAGE) 500 MG tablet 11/22/2021 at Unknown time  Yes Yes   Sig: Take 500 mg by mouth 2 times daily (with meals)   metoprolol succinate ER (TOPROL-XL) 100 MG 24 hr tablet 11/22/2021 at Unknown time  Yes Yes   Sig: Take 50 mg by mouth daily    simvastatin (ZOCOR) 40 MG tablet 11/22/2021 at Unknown time  Yes Yes   Sig: TAKE 1 TABLET BY MOUTH EVERY NIGHT AT BEDTIME   vitamin B-12 (CYANOCOBALAMIN) 1000 MCG tablet 11/22/2021 at Unknown time  Yes Yes   Sig: Take 1 tablet by mouth daily   warfarin ANTICOAGULANT (COUMADIN) 5 MG tablet 11/22/2021 at Unknown time  Yes Yes   Sig: TAKE 1/2 TO 1 TABLET BY MOUTH DAILY AS DIRECTED PER INR CLINIC      Facility-Administered Medications: None     Allergies   No Known Allergies    Physical Exam   Vital Signs: Temp: 98.5  F (36.9  C) Temp src: Oral BP: (!) 146/64 Pulse: 81   Resp: 16 SpO2: 96 % O2 Device: None (Room  air)    Weight: 150 lbs 0 oz    Constitutional: awake, alert, cooperative, no apparent distress, and appears stated age  Eyes: Lids and lashes normal, pupils equal, round and reactive to light, extra ocular muscles intact, sclera clear, conjunctiva normal  ENT: Normocephalic, without obvious abnormality, atraumatic, sinuses nontender on palpation, external ears without lesions, oral pharynx with moist mucous membranes, tonsils without erythema or exudates, gums normal and good dentition.  Hematologic / Lymphatic: no cervical lymphadenopathy, tender submandibular nodes with mild fluctuance.  Respiratory: No increased work of breathing, good air exchange, clear to auscultation bilaterally, no crackles or wheezing  Cardiovascular: Regular rate and rhythm, normal S1 and S2, no S3 or S4, and no murmur noted  GI: No scars, normal bowel sounds, soft, non-distended, non-tender, no masses palpated, no hepatosplenomegally  Skin: no bruising or bleeding  Musculoskeletal: There is no redness, warmth, or swelling of the joints. Full range of motion noted. Motor strength is 5 out of 5 all extremities bilaterally. Tone is normal.  Neurologic: Awake, alert, oriented to name, place and time. Cranial nerves II-XII are grossly intact. Motor is 5 out of 5 bilaterally.   Neuropsychiatric: General: normal, calm and normal eye contact    Data   Data reviewed today: I reviewed all medications, new labs and imaging results over the last 24 hours.

## 2021-11-24 NOTE — ANESTHESIA PREPROCEDURE EVALUATION
Anesthesia Pre-Procedure Evaluation    Patient: Frank Doss   MRN: [id number] : 1928        Preoperative Diagnosis: Infection of mandible [M27.2]    Procedure : Procedure(s):  IRRIGATION AND DEBRIDEMENT, MANDIBLE  EXTRACTION, TOOTH          Frank Doss is a 89 year old male admitted on 2021. He has a history of type 2 diabetes mellitus, paroxysmal atrial fibrillation (anticoagulated on Coumadin), CAD (s/p CABG x3-1999, s/p PTCA/stent of the RCA-2013), hyperlipidemia, hypertension, and prostate cancer who is admitted for management of submandibular swelling 2/2 abscess and osteomyelitis.     # Paroxysmal atrial fibrillation  On Warfarin for atrial fibrillation. No complaints of palpitation currently.  - EKG ordered  - Telemetry ordered  - Holding warfarin for procedure on   - INR ordered     # Type 2 Diabetes Mellitus  On metformin and glyburide at home. Presenting . Hemoglobin A1c in May 8.0.  - Medium sliding scale insulin   - Holding home metformin and glyburide  - Hemoglobin A1c ordered  - Glucose monitoring with hypoglycemic protocol     # CAD (s/p CABG x3-1999, s/p PTCA/stent of the RCA-2013)  On ASA 81 mg at home.  - Holding ASA for procedure on      # HLD  - PTA simvastatin     # HTN  - PTA metoprolol ER     # Anemia; chronic  Presenting hemoglobin 10.9. No evidence of bleeding  - CTM    No past medical history on file.   No past surgical history on file.   Not on File   Social History     Tobacco Use     Smoking status: Not on file     Smokeless tobacco: Not on file   Substance Use Topics     Alcohol use: Not on file      Wt Readings from Last 1 Encounters:   21 61.4 kg (135 lb 5.8 oz)        Anesthesia Evaluation            ROS/MED HX  ENT/Pulmonary:       Neurologic:       Cardiovascular:     (+) Dyslipidemia hypertension--CAD -CABG-date: . stent-. Drug Eluting Stent. dysrhythmias, a-fib,  (-) murmur and wheezes   METS/Exercise Tolerance:      Hematologic:       Musculoskeletal:       GI/Hepatic:       Renal/Genitourinary:       Endo:     (+) type II DM,     Psychiatric/Substance Use:       Infectious Disease:       Malignancy:   (+) Malignancy, History of Prostate.    Other:            Physical Exam    Airway        Mallampati: II   TM distance: > 3 FB   Neck ROM: full   Mouth opening: > 3 cm    Respiratory Devices and Support         Dental  no notable dental history     (+) missing, loose and other      Cardiovascular          Rhythm and rate: regular and normal (-) no systolic click and no murmur    Pulmonary   pulmonary exam normal        breath sounds clear to auscultation   (-) no wheezes        OUTSIDE LABS:  CBC:   Lab Results   Component Value Date    WBC 11.2 (H) 11/24/2021    WBC 14.1 (H) 11/23/2021    HGB 10.6 (L) 11/24/2021    HGB 11.1 (L) 11/23/2021    HCT 32.2 (L) 11/24/2021    HCT 33.9 (L) 11/23/2021     11/24/2021     11/23/2021     BMP:   Lab Results   Component Value Date     11/24/2021     11/23/2021    POTASSIUM 4.0 11/24/2021    POTASSIUM 4.2 11/23/2021    CHLORIDE 105 11/24/2021    CHLORIDE 103 11/23/2021    CO2 27 11/24/2021    CO2 25 11/23/2021    BUN 20 11/24/2021    BUN 23 11/23/2021    CR 0.89 11/24/2021    CR 0.93 11/23/2021     (H) 11/24/2021     (H) 11/24/2021     COAGS:   Lab Results   Component Value Date    INR 2.98 (H) 11/24/2021     POC: No results found for: BGM, HCG, HCGS  HEPATIC:   Lab Results   Component Value Date    ALBUMIN 2.7 (L) 11/24/2021    PROTTOTAL 6.6 (L) 11/24/2021    ALT 10 11/24/2021    AST 8 11/24/2021    ALKPHOS 64 11/24/2021    BILITOTAL 0.8 11/24/2021     OTHER:   Lab Results   Component Value Date    A1C 6.7 (H) 11/23/2021    VANITA 9.2 11/24/2021       Anesthesia Plan    ASA Status:  3, emergent    NPO Status:  NPO Appropriate    Anesthesia Type: General.     - Airway: ETT   Induction: Intravenous.   Maintenance: Inhalation.   Techniques and Equipment:      - Airway: Video-Laryngoscope         Consents    Anesthesia Plan(s) and associated risks, benefits, and realistic alternatives discussed. Questions answered and patient/representative(s) expressed understanding.    - Discussed:     - Discussed with:  Patient      - Extended Intubation/Ventilatory Support Discussed: Yes.      - Patient is DNR/DNI Status: No    Use of blood products discussed: Yes.     - Discussed with: Patient.     Postoperative Care    Pain management: IV analgesics.   PONV prophylaxis: Ondansetron (or other 5HT-3), Dexamethasone or Solumedrol     Comments:                Christopher J. Behrens, MD

## 2021-11-24 NOTE — PLAN OF CARE
BP (!) 148/59 (BP Location: Right arm)   Pulse 72   Temp (!) 95.9  F (35.5  C) (Oral)   Resp 16   Wt 68 kg (150 lb)   SpO2 96%      Time: 0769-2932    Reason for admission: Mouth swelling  Activity: A1/SBA. Steady on feet. Bed alarm in place for safety.   Pain: Denies. Pt stating having pain w/ swallowing.   Neuro: A& O x4. Intermittently disoriented to time. Forgetful.  Cardiac: HTN. Murmer.   Respiratory: Pt on RA. Denies SOB. Lung sounds clear.   GI/: Voiding spontaneously w/ no difficulty. Intermittently incontinent. No BM overnight- Last BM .   Diet: NPO since midnight.   Lines: PIV- saline locked.   Labs/Imaging: WBC: 14.1, B    New changes this shift:    Pt arrived to unit via stretcher from ED. Belongings remain w/ pt. Oriented to room & call light in reach. Bed alarm placed due to pt being forgetful. Pt having more difficulty w/ swallowing & taking pills with submandibular abscess. NPO since midnight for possible I&D.     Continue to monitor and follow POC

## 2021-11-24 NOTE — PLAN OF CARE
Time 3151-2554    Reason for admission: Mouth swelling [R22.0]  Mandibular swelling [R22.0]   Vitals: /52 (BP Location: Right arm)   Pulse 78   Temp 97.6  F (36.4  C) (Oral)   Resp 18   Wt 61.4 kg (135 lb 5.8 oz)   SpO2 96%    Activity: Assist of 1, SBA. Pt is steady on feet, bed alarm in place for safety precautions.   Pain: Pt states intermittent pain with swallowing.   Neuro: A &Ox4     Cardiac: Murmer detected.     Respiratory: VSS on room air, lung sounds clear equal bilaterally  GI/: Last BM 11/23. Pt ambulates to bathroom independently, voids without difficulty  Diet: Orders Placed This Encounter      NPO per Anesthesia Guidelines for Procedure/Surgery Except for: Meds     Lines: R PIV - SL. Used for ampicillin-sulbactam during shift.   Skin/Wounds: Pt has swelling around neck/throat. Edema noted on BL LE.   Labs/imaging: Pt had panoramic x-ray of mouth performed - results pending.         New changes this shift: Pt NPO - Has biopsy scheduled for submandibular abscess. Pt received ampicillin-sulbactam 2x this shift.      Continue to monitor and follow POC

## 2021-11-24 NOTE — ANESTHESIA PROCEDURE NOTES
Airway       Patient location during procedure: OR       Procedure Start/Stop Times: 11/24/2021 5:24 PM  Staff -        CRNA: Jocelyne Dumont APRN CRNA       Performed By: CRNA  Consent for Airway        Urgency: elective  Indications and Patient Condition       Indications for airway management: michael-procedural       Induction type:intravenous       Mask difficulty assessment: 2 - vent by mask + OA or adjuvant +/- NMBA    Final Airway Details       Final airway type: endotracheal airway       Successful airway: ETT - single and Oral  Endotracheal Airway Details        ETT size (mm): 7.5       Cuffed: yes       Successful intubation technique: video laryngoscopy       VL Blade Size: MAC 3       Grade View of Cords: 1       Adjucts: stylet       Position: Right       Measured from: gums/teeth       Secured at (cm): 21       Bite block used: None    Post intubation assessment        Placement verified by: capnometry, equal breath sounds and chest rise        Number of attempts at approach: 2 (attempted with pathak 2. unsuccessful due to neck stiffness)       Secured with: silk tape       Ease of procedure: easy       Dentition: Intact and Unchanged

## 2021-11-24 NOTE — ED NOTES
Pt came in for swelling ,facial abcess in jaw/neck area and going to OR today for surgery.Denies SOB or pain now but has pain with swallowing etc A&O x3 .BP (!) 152/82 (BP Location: Right arm)   Pulse 76   Temp 98.5  F (36.9  C) (Oral)   Resp 18   Wt 68 kg (150 lb)   SpO2 96% RA and blood glucose 151 and NPO since midnight.Was incontinent of urine x1 when sleeping.PIV saline locked.Pt to transfer to  and report called

## 2021-11-25 VITALS
HEART RATE: 85 BPM | OXYGEN SATURATION: 97 % | TEMPERATURE: 97.3 F | WEIGHT: 132.6 LBS | SYSTOLIC BLOOD PRESSURE: 139 MMHG | RESPIRATION RATE: 16 BRPM | DIASTOLIC BLOOD PRESSURE: 63 MMHG

## 2021-11-25 LAB
ANION GAP SERPL CALCULATED.3IONS-SCNC: 12 MMOL/L (ref 3–14)
BUN SERPL-MCNC: 30 MG/DL (ref 7–30)
CALCIUM SERPL-MCNC: 9.1 MG/DL (ref 8.5–10.1)
CHLORIDE BLD-SCNC: 105 MMOL/L (ref 94–109)
CO2 SERPL-SCNC: 23 MMOL/L (ref 20–32)
CREAT SERPL-MCNC: 0.96 MG/DL (ref 0.66–1.25)
ERYTHROCYTE [DISTWIDTH] IN BLOOD BY AUTOMATED COUNT: 13.9 % (ref 10–15)
GFR SERPL CREATININE-BSD FRML MDRD: 68 ML/MIN/1.73M2
GLUCOSE BLD-MCNC: 230 MG/DL (ref 70–99)
GLUCOSE BLDC GLUCOMTR-MCNC: 177 MG/DL (ref 70–99)
GLUCOSE BLDC GLUCOMTR-MCNC: 218 MG/DL (ref 70–99)
GRAM STAIN RESULT: ABNORMAL
GRAM STAIN RESULT: ABNORMAL
HCT VFR BLD AUTO: 32.9 % (ref 40–53)
HGB BLD-MCNC: 10.6 G/DL (ref 13.3–17.7)
INR PPP: 3.42 (ref 0.85–1.15)
MAGNESIUM SERPL-MCNC: 2 MG/DL (ref 1.6–2.3)
MCH RBC QN AUTO: 30.3 PG (ref 26.5–33)
MCHC RBC AUTO-ENTMCNC: 32.2 G/DL (ref 31.5–36.5)
MCV RBC AUTO: 94 FL (ref 78–100)
PHOSPHATE SERPL-MCNC: 4 MG/DL (ref 2.5–4.5)
PLATELET # BLD AUTO: 363 10E3/UL (ref 150–450)
POTASSIUM BLD-SCNC: 4.2 MMOL/L (ref 3.4–5.3)
RBC # BLD AUTO: 3.5 10E6/UL (ref 4.4–5.9)
SODIUM SERPL-SCNC: 140 MMOL/L (ref 133–144)
WBC # BLD AUTO: 13.3 10E3/UL (ref 4–11)

## 2021-11-25 PROCEDURE — 36415 COLL VENOUS BLD VENIPUNCTURE: CPT | Performed by: STUDENT IN AN ORGANIZED HEALTH CARE EDUCATION/TRAINING PROGRAM

## 2021-11-25 PROCEDURE — 99239 HOSP IP/OBS DSCHRG MGMT >30: CPT | Mod: GC | Performed by: STUDENT IN AN ORGANIZED HEALTH CARE EDUCATION/TRAINING PROGRAM

## 2021-11-25 PROCEDURE — 250N000013 HC RX MED GY IP 250 OP 250 PS 637: Performed by: STUDENT IN AN ORGANIZED HEALTH CARE EDUCATION/TRAINING PROGRAM

## 2021-11-25 PROCEDURE — 85610 PROTHROMBIN TIME: CPT | Performed by: STUDENT IN AN ORGANIZED HEALTH CARE EDUCATION/TRAINING PROGRAM

## 2021-11-25 PROCEDURE — 83735 ASSAY OF MAGNESIUM: CPT | Performed by: STUDENT IN AN ORGANIZED HEALTH CARE EDUCATION/TRAINING PROGRAM

## 2021-11-25 PROCEDURE — 84100 ASSAY OF PHOSPHORUS: CPT | Performed by: STUDENT IN AN ORGANIZED HEALTH CARE EDUCATION/TRAINING PROGRAM

## 2021-11-25 PROCEDURE — 85027 COMPLETE CBC AUTOMATED: CPT | Performed by: STUDENT IN AN ORGANIZED HEALTH CARE EDUCATION/TRAINING PROGRAM

## 2021-11-25 PROCEDURE — 250N000011 HC RX IP 250 OP 636

## 2021-11-25 PROCEDURE — 80048 BASIC METABOLIC PNL TOTAL CA: CPT | Performed by: STUDENT IN AN ORGANIZED HEALTH CARE EDUCATION/TRAINING PROGRAM

## 2021-11-25 RX ORDER — CHLORHEXIDINE GLUCONATE ORAL RINSE 1.2 MG/ML
15 SOLUTION DENTAL 2 TIMES DAILY
Qty: 1893 ML | Refills: 0 | Status: SHIPPED | OUTPATIENT
Start: 2021-11-25

## 2021-11-25 RX ORDER — CHLORHEXIDINE GLUCONATE ORAL RINSE 1.2 MG/ML
15 SOLUTION DENTAL 2 TIMES DAILY
Status: DISCONTINUED | OUTPATIENT
Start: 2021-11-25 | End: 2021-11-25 | Stop reason: HOSPADM

## 2021-11-25 RX ADMIN — METOPROLOL SUCCINATE 50 MG: 50 TABLET, EXTENDED RELEASE ORAL at 07:56

## 2021-11-25 RX ADMIN — INSULIN ASPART 2 UNITS: 100 INJECTION, SOLUTION INTRAVENOUS; SUBCUTANEOUS at 07:56

## 2021-11-25 RX ADMIN — AMOXICILLIN AND CLAVULANATE POTASSIUM 1 TABLET: 875; 125 TABLET, FILM COATED ORAL at 10:07

## 2021-11-25 RX ADMIN — AMPICILLIN SODIUM AND SULBACTAM SODIUM 3 G: 2; 1 INJECTION, POWDER, FOR SOLUTION INTRAMUSCULAR; INTRAVENOUS at 06:54

## 2021-11-25 RX ADMIN — CHLORHEXIDINE GLUCONATE 15 ML: 1.2 SOLUTION ORAL at 13:34

## 2021-11-25 RX ADMIN — AMPICILLIN SODIUM AND SULBACTAM SODIUM 3 G: 2; 1 INJECTION, POWDER, FOR SOLUTION INTRAMUSCULAR; INTRAVENOUS at 01:09

## 2021-11-25 RX ADMIN — INSULIN ASPART 1 UNITS: 100 INJECTION, SOLUTION INTRAVENOUS; SUBCUTANEOUS at 12:09

## 2021-11-25 ASSESSMENT — ACTIVITIES OF DAILY LIVING (ADL)
ADLS_ACUITY_SCORE: 16
ADLS_ACUITY_SCORE: 20
ADLS_ACUITY_SCORE: 16

## 2021-11-25 NOTE — PROGRESS NOTES
ORAL & MAXILLOFACIAL SURGERY   PROGRESS NOTE  Frank Doss,  MRN: [id number],  : 1928           ASSESSMENT:  89 year old male post op day 1 s/p extraction of teeth #24, 25 and transoral incision and drainage of anterior mandible with biopsy of bone/infectious tissue. Patient is progressing well with floor of mouth softer than pre-operative but still with some firmness and distension. We monitored the patient in the morning and again in the afternoon, after the patient was taken off IV abx and switched to PO. The patient continues to progress well with mild decrease in firmness from this morning. Ok for discharge from OMFS perspective.       RECOMMENDATIONS/PLAN:  - Transition from IV Unasyn to PO Augmentin 875-125 mg BID for 7 day course   - Chlorhexidine rinses BID   - Soft diet   - Ice to face as needed for comfort   - Pain meds per primary   - OK to discharge from OMFS perspective.   - Follow up in our clinic Monday 8 am, 7th floor, [place] [address].        Discussed with chief resident and staff.    Gigi Fox DDS  Oral & Maxillofacial Surgery, PGY-1      Please contact the OMFS resident on-call with questions or concerns.  ____________________________________      SUBJECTIVE:  NAEO. Pt has ambulated, voided, tolerated PO, pain well controlled     PHYSICAL EXAM:   GEN: Obese male, NAD  HEAD: NC  EYES: PERRL, EOMI, visual acuity intact  EARS: Atraumatic, hearing at conversational levels  NOSE: Dried heme in nares b/l, no active epistaxis  MAXILLOFACIAL: NC/AT, EOMI, PERRL, firm submental space consistent with pre-op.    Intraoral: Buccal vestibules are without swelling, extraction sites #24, 25 healing appropriately and are hemostatic, lingual floor of mouth flap is reapproximated with sutures in place, FOM is elevated and softer today but still with some firmness consistent with pre-op and now with surgery. All sites are hemostatic. Occlusion stable and  reproducible. Tooth #26 is grade II mobile. NILSA >35 mm.   NEURO: AAOx1, CN V intact, CN VII grossly intact bilaterally    LABS:   CBC RESULTS: Recent Labs   Lab Test 11/25/21  0704   WBC 13.3*   RBC 3.50*   HGB 10.6*   HCT 32.9*   MCV 94   MCH 30.3   MCHC 32.2   RDW 13.9         Last Comprehensive Metabolic Panel:  Sodium   Date Value Ref Range Status   11/24/2021 138 133 - 144 mmol/L Final     Potassium   Date Value Ref Range Status   11/24/2021 4.0 3.4 - 5.3 mmol/L Final     Chloride   Date Value Ref Range Status   11/24/2021 105 94 - 109 mmol/L Final     Carbon Dioxide (CO2)   Date Value Ref Range Status   11/24/2021 27 20 - 32 mmol/L Final     Anion Gap   Date Value Ref Range Status   11/24/2021 6 3 - 14 mmol/L Final     Glucose   Date Value Ref Range Status   11/24/2021 182 (H) 70 - 99 mg/dL Final     GLUCOSE BY METER POCT   Date Value Ref Range Status   11/25/2021 218 (H) 70 - 99 mg/dL Final     Urea Nitrogen   Date Value Ref Range Status   11/24/2021 20 7 - 30 mg/dL Final     Creatinine   Date Value Ref Range Status   11/24/2021 0.89 0.66 - 1.25 mg/dL Final     GFR Estimate   Date Value Ref Range Status   11/24/2021 74 >60 mL/min/1.73m2 Final     Comment:     As of July 11, 2021, eGFR is calculated by the CKD-EPI creatinine equation, without race adjustment. eGFR can be influenced by muscle mass, exercise, and diet. The reported eGFR is an estimation only and is only applicable if the renal function is stable.     Calcium   Date Value Ref Range Status   11/24/2021 9.2 8.5 - 10.1 mg/dL Final        RADIOLOGY:  None new

## 2021-11-25 NOTE — OP NOTE
Oral & Maxillofacial Surgery Operative Note:      Procedure:   Transoral incision and drainage of bilateral sublingual space infection. Cultures obtained. Extraction of teeth #24 and 25     Pre-Operative Diagnosis:   bilateral sublingual space infection     Post-Operative Diagnosis: Same      STAFF SURGEON: Dimitry Caballero DDS, MD, FACS  RESIDENT SURGEON: Sher Ayon DDS  : Tori Tabares DDS, Gigi Fox DDS, Tori Staples DDS     PERIOPERATIVE ANTIBIOTICS: Cefazolin 2g     ESTIMATED BLOOD LOSS: 5 ml     URINE OUTPUT: None.     LOCAL ANESTHESIA: 4 cc total of 0.5% bupivacaine with 1:200,000 epinephrine delivered via bilateral VINICIO, long buccal nerve blocks and local infiltration      SPECIMENS: Cultures sent     COMPLICATIONS: None     DRAINS: None   INDICATIONS FOR PROCEDURE: 89y/o male with a history of afib, DMII, CAD, and prostate cancer presented as a transfer from [facility] for evaluation of sublingual swelling and odynophagia.  Imaging revealed carious teeth #24 and 25 as well as separation of lingual plate from mandible.  Due to these factors, it was decided that the patient would be best served in the OR under general anesthesia.       DESCRIPTION OF PROCEDURE: The patient was met preoperatively and the treatment plan was confirmed with the patient.  Patient was brought back to operating room 10 by the Anesthesia Service. Patient transferred himself to the table and was induced to general anesthesia. Following induction of anesthesia, an oral endotracheal tube was placed and taped  by the Anesthesia Service. The patient's arms were tucked and padded, and the oral cavity was prepped. A throat pack was placed. Local anesthesia was used intraorally via local infiltration and chlorhexidine rinse was used in the oral cavity. The oral cavity was suctioned. The face was painted in usual sterile fashion.  Surgeons stepped out to scrub and returned to don sterile gown and gloves. At that time, the surgical  site was squared off and a split sheet drape was placed.      First attention was turned to the oral cavity and the sublingual swelling.  An 18 gauge needle was utilized to aspirate the swelling with scant purulent aspirate, which was sent for culture. A scant amount of aspiration was obtained and sent for culture. A 15 blade was used to make an lingual sulcular incision through the mucosa. A #9 molt was then utilized to bluntly dissect down to abscess. Teeth #24 and 25 were carious and mobile. They were elevated and delivered with rongeurs. Extraction sites curetted and irrigated. Noted a lingual bony perforation at the apex of teeth #24 and 25. Biopsy bone at extraction site and surrounding tissue. Gingiva was loosely reapproximated with 3-0 chromic gut suture.     The patient's oral cavity was suctioned. The throat pack was removed.  An OG tube was passed. The patient was turned over to the Anesthesia Service and was awakened in the OR and transferred stable to the PACU.     Attending surgeon was present for the entirety of the procedure.     Complications: None.      Sher Ayon DDS  [facility] resident

## 2021-11-25 NOTE — PHARMACY-ANTICOAGULATION SERVICE
Warfarin Therapy Hold Note  This patient is currently receiving warfarin for Atrial fibrillation.    Goal INR:  2-3.      Anticoagulation Dose History     Recent Dosing and Labs Latest Ref Rng & Units 11/23/2021 11/24/2021 11/24/2021 11/25/2021    INR 0.85 - 1.15 2.57(H) 2.98(H) 2.83(H) 3.42(H)            Bleeding Signs/Symptoms:  None    Assessment:  Current INR is supratherapeutic.  This is most likely due to:  nutrition changes OR stress from acute illness    Plan:  1) HOLD today s warfarin dose.   An order has been placed in EPIC for  Warfarin- No Dose Today      The primary team has been contacted about the above plan/primary team is aware of need to hold dose/team notification not necessary.

## 2021-11-25 NOTE — PHARMACY-ANTICOAGULATION SERVICE
Clinical Pharmacy - Warfarin Dosing Consult     Pharmacy has been consulted to manage this patient s warfarin therapy.  Indication: Atrial Fibrillation  Therapy Goal: INR 2-3  Warfarin Prior to Admission: Yes  Warfarin PTA Regimen: 5 mg on MW, 2.5 MG THE REST OF THE WEEK  Significant drug interactions: AUGMENTIN (Increased INR)  Recent documented change in oral intake/nutrition: Unknown  Dose Comments: Hold dose for Supratherapeutic INR    INR   Date Value Ref Range Status   11/25/2021 3.42 (H) 0.85 - 1.15 Final   11/24/2021 2.83 (H) 0.85 - 1.15 Final       Recommend warfarin 0 mg today.  Pharmacy will monitor Frank Doss daily and order warfarin doses to achieve specified goal.      Please contact pharmacy as soon as possible if the warfarin needs to be held for a procedure or if the warfarin goals change.

## 2021-11-25 NOTE — OR NURSING
Pt incontinent of urine and attempted to change brief.  Pt became agitated and threatening violence.  Unable to get clean brief on.  Restraints reapplied for safety.

## 2021-11-25 NOTE — PROGRESS NOTES
/42 (BP Location: Left arm)   Pulse 72   Temp 97.3  F (36.3  C) (Oral)   Resp 16   Wt 60.1 kg (132 lb 9.6 oz)   SpO2 97%     Time 3277-9454      Reason for admission: Mouth swelling, Mandibular swelling  Vitals: VSS on RA   Activity: Up w/assist 1  Pain: Denies pain  Neuro: A&Ox4, speech logical  Mood/Behavior: calm, cooperative  Cardiac: WNL  Respiratory: WNL  GI/: WNL  Diet: Soft diet  Lines: PIV  Skin: CDI      New changes this shift: IV abx dc'd, started on PO abx.    Plan: Pt will discharge home this afternoon w/ son.

## 2021-11-25 NOTE — PROVIDER NOTIFICATION
Updated Dr. Ulloa of pt poor intake . Had the urge to void but only had 10 ml urine. Bladder  scanned and result is 365 ml. Pt denies any pain and discomfort. No new order given

## 2021-11-25 NOTE — OR NURSING
"Late entry: pt arrived to floor at approximately 1830.  Pt very agitated and combative.  Hitting and kicking at staff.  Pt cannot answer any orientation questions and keeps repeating \"you're crazy\" or \"I am going to hit you and it is going to hurt.\"  MDA Dr. Chapman at bedside and soft wrist restraints placed.  CRNA gave haldol which helped pt calm somewhat.  Pt continues to have periods of agitation when awake.  Dr. Jasso aware and ok with pt transfer back to floor.  Will enter sign out when able.  "

## 2021-11-25 NOTE — DISCHARGE SUMMARY
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     Unresulted Labs Ordered in the Past 30 Days of this Admission     Date and Time Order Name Status Description    11/24/2021  5:53 PM Surgical Pathology Exam In process     11/24/2021  5:39 PM Aspirate Aerobic Bacterial Culture Routine Preliminary     11/24/2021  5:39 PM Anaerobic Bacterial Culture Routine In process     11/23/2021  7:18 PM Blood Culture Peripheral Blood Preliminary     11/23/2021  7:18 PM Blood Culture Peripheral Blood Preliminary       These results will be followed up by Dr. Frey and [facility]    Discharge Disposition   Discharged to home  Condition at discharge: Stable      Hospital Course   Frank Doss is a 9  3 year old male admitted on 11/23/2021. He has a history of type 2 diabetes mellitus, paroxysmal atrial fibrillation (anticoagulated on Coumadin), CAD (s/p CABG x3-11/1999, s/p PTCA/stent of the RCA-2/2013), hyperlipidemia, hypertension, and prostate cancer who is admitted for management of submandibular swelling 2/2 abscess and osteomyelitis.  Briefly the patient has had increasing submandibular swelling and some dysphagia over the last month and presented to [facility] and was transferred to [facility] for oral surgery consultation after CT showed mandibular  osteomyelitis and abscess.  The patient was started on ampicillin/sulbactam and seen by OMFS; he was taken to the OR for a transoral  I&D of anterior mandible with biopsy as well as  extraction of teeth #24 and 25.  He did well postoperatively and was transitioned to oral augmentin; he continued to have swelling but no systemic signs or symptoms of infection and was able to tolerate an oral diet.  He was prescribed chlorhexidine mouthwash and 7 days of augmentin; further antibiotics will be determined as an outpatient.     The patient did have an episode of confusion requiring redirection and restraints, most likely due to hospital delirium; this resolved.     The patient's coumadin was restarted and all other medications continued; he will follow up in 4 days in the OMFS clinic and cultures be followed closely.  Given antibiotic use he will need to contact his coumadin for closer monitoring.         Consultations This Hospital Stay   ORAL MAXILLOFACIAL SURGERY ADULT IP CONSULT  PHYSICAL THERAPY ADULT IP CONSULT  OCCUPATIONAL THERAPY ADULT IP CONSULT  ADVANCE DIRECTIVE IP CONSULT  PHARMACY TO DOSE WARFARIN    Code Status   Full Code         Cleve Frey MD  [address] [address]  Phone: [phone]    40 min spent on discharge activities    ______________________________________________________________________    Physical Exam   Vital Signs: Temp: 97.3  F (36.3  C) Temp src: Oral BP: 125/42 Pulse: 72   Resp: 16 SpO2: 97 % O2 Device: None (Room air) Oxygen Delivery: 2 LPM  Weight: 132 lbs 9.6 oz  General Appearance: Well appearing.  HEENT: There is ongoing but improved submandibular fullness with mild tenderness to palpation; mouth floor incision present and intact   Respiratory: clear to auscultation bilaterally with good air entry   Cardiovascular: normal rate, regular rhythm.  II/VI systolic ejection murmur at RUSB.  JVP not elevated  GI:  soft, non-tender, non-distended.  No hepatosplenomegaly or mass.   Skin: no rash   Other: Awake, alert, oriented.        Primary Care Physician   [facility]    Discharge Orders      Reason for your hospital stay    Dear Frank Doss    Your were hospitalized at [facility] with a bacterial collection in your jaw, called an abscess. Your treatment consisted of antibiotics and surgery.  Over your hospitalization your abscess improved and today you are ready to be discharged home.  If you continue your antibiotics you should continue to improve but if you develop fever, shortness of breath, light headedness, chest pain, jaw pain, or difficulty swallowing, please seek medical attention.    It was a pleasure meeting with you today. Thank you for allowing me and my team the privilege of caring for you today. You are the reason we are here, and I truly hope we provided you with the excellent service you deserve. Please let us know if there is anything else we can do for you so that we can be sure you are leaving completely satisfied with your care experience.    Your hospital unit at the time of discharge is 5A  so if you have any questions please call the hospital at [phone] and ask to talk to a nurse on 5A.    Take care!    GERTRUDE Steinberg MD  Internal Medicine  [facility]     Activity    Your activity upon discharge: activity as tolerated     Adult [facility] Follow-up and recommended labs and tests    Follow up with primary care provider, [facility], within 7 days for hospital follow- up.  No follow up labs or test are needed.    Follow up with orofacial surgery within 1 week.  They will schedule your appointment      Appointments on [place] and/or [facility] (with [facility] or [facility] provider or service). Call [phone] if you haven't heard regarding these appointments within 7 days of discharge.     Discharge Instructions     We are suggesting the following medication changes:  - Take augmentin twice daily for 7 days.  You will follow up with surgery to determine if you need more antibiotics.     Diet    Follow this diet upon discharge: Orders Placed This Encounter      Easy to Chew Diet (level 7)       Significant Results and Procedures   Most Recent 3 CBC's:  Recent Labs   Lab Test 11/25/21  0704 11/24/21  0624 11/23/21 2010   WBC 13.3* 11.2* 14.1*   HGB 10.6* 10.6* 11.1*   MCV 94 92 92    340 353     Most Recent 3 BMP's:  Recent Labs   Lab Test 11/25/21  1157 11/25/21  0704 11/25/21  0224 11/24/21  0643 11/24/21  0624 11/24/21  0134 11/23/21 2010   NA  --  140  --   --  138  --  137   POTASSIUM  --  4.2  --   --  4.0  --  4.2   CHLORIDE  --  105  --   --  105  --  103   CO2  --  23  --   --  27  --  25   BUN  --  30  --   --  20  --  23   CR  --  0.96  --   --  0.89  --  0.93   ANIONGAP  --  12  --   --  6  --  9   VANITA  --  9.1  --   --  9.2  --  9.2   * 230* 218*   < > 182*   < > 196*    < > = values in this interval not displayed.   , No results found for this or any previous visit.    Discharge Medications   Current Discharge Medication List      START taking these medications    Details   amoxicillin-clavulanate (AUGMENTIN) 875-125 MG tablet Take 1 tablet by mouth 2 times daily for 7 days  Qty: 14 tablet, Refills: 0    Associated Diagnoses: Abscess of neck      chlorhexidine (PERIDEX) 0.12 % solution Swish and spit 15 mLs in mouth 2 times daily  Qty: 1893 mL, Refills: 0    Associated Diagnoses: Abscess of neck         CONTINUE these medications which have NOT CHANGED    Details   aspirin 81 MG EC tablet Take 81 mg by mouth daily      glyBURIDE (DIABETA /MICRONASE) 5 MG tablet Take 2 tablets (10 mg) by mouth once daily.      metFORMIN (GLUCOPHAGE) 500 MG tablet Take 500 mg by mouth 2 times daily (with meals)      metoprolol succinate ER (TOPROL-XL) 100 MG 24 hr tablet Take 50 mg by mouth daily       simvastatin  (ZOCOR) 40 MG tablet TAKE 1 TABLET BY MOUTH EVERY NIGHT AT BEDTIME      vitamin B-12 (CYANOCOBALAMIN) 1000 MCG tablet Take 1 tablet by mouth daily      warfarin ANTICOAGULANT (COUMADIN) 5 MG tablet TAKE 1/2 TO 1 TABLET BY MOUTH DAILY AS DIRECTED PER INR CLINIC           Allergies   No Known Allergies

## 2021-11-25 NOTE — PROGRESS NOTES
[id number]    Pt left unit for 3C for I&D of mandible and tooth extraction around 1515, son at bedside.

## 2021-11-25 NOTE — ANESTHESIA POSTPROCEDURE EVALUATION
Patient: Frank Doss    Procedure: Procedure(s):  IRRIGATION AND DEBRIDEMENT, MANDIBLE  EXTRACTION, TEETH #24, #25       Diagnosis:Infection of mandible [M27.2]  Diagnosis Additional Information: No value filed.    Anesthesia Type:  General    Note:  Disposition: Admission   Postop Pain Control: Uneventful            Sign Out: Well controlled pain   PONV: No   Neuro/Psych:             Events: Emergence delirium            Sign Out: Acceptable/Baseline neuro status   Airway/Respiratory: Uneventful            Sign Out: Acceptable/Baseline resp. status   CV/Hemodynamics: Uneventful            Sign Out: Acceptable CV status; No obvious hypovolemia; No obvious fluid overload   Other NRE: NONE   DID A NON-ROUTINE EVENT OCCUR? YES    Event details/Postop Comments:  Patient became agitated and aggressive immediately upon arrival to the PACU, reorientation to place and time was unaffective, 2.5mg Halodol given with good response. Patient also had some hypertension treated with hydralazine.           Last vitals:  Vitals Value Taken Time   /71 11/1935   Temp 36.5  C (97.7  F) 11/24/21 1915   Pulse 75 11/24/21 1944   Resp 15 11/24/21 1915   SpO2 100 % 11/24/21 1944   Vitals shown include unvalidated device data.    Electronically Signed By: Christopher J. Behrens, MD  November 24, 2021  7:45 PM

## 2021-11-25 NOTE — ANESTHESIA CARE TRANSFER NOTE
Patient: Frank Doss    Procedure: Procedure(s):  IRRIGATION AND DEBRIDEMENT, MANDIBLE  EXTRACTION, TEETH #24, #25       Diagnosis: Infection of mandible [M27.2]  Diagnosis Additional Information: No value filed.    Anesthesia Type:   General     Note:    Oropharynx: oropharynx clear of all foreign objects and spontaneously breathing  Level of Consciousness: awake  Oxygen Supplementation: room air    Independent Airway: airway patency satisfactory and stable  Dentition: S/P dental procedure  Vital Signs Stable: post-procedure vital signs reviewed and stable  Report to RN Given: handoff report given  Patient transferred to: PACU  Comments: Patient agitated in PACU upon arrival. Pt swinging arms and legs and attempting to get out of bed. 2.5mg Haldol given IV with effect.   Handoff Report: Identifed the Patient, Identified the Reponsible Provider, Reviewed the pertinent medical history, Discussed the surgical course, Reviewed Intra-OP anesthesia mangement and issues during anesthesia, Set expectations for post-procedure period and Allowed opportunity for questions and acknowledgement of understanding      Vitals:  Vitals Value Taken Time   /79 11/24/21 1843   Temp     Pulse 104 11/24/21 1846   Resp     SpO2 96 % 11/24/21 1846   Vitals shown include unvalidated device data.    Electronically Signed By: GABRIELA Partida CRNA  November 24, 2021  6:47 PM

## 2021-11-25 NOTE — PLAN OF CARE
Received pt from PACU at 2040 accompanied by staff . S/P Irrigation and Debridement, Mandible extraction of teeth #24 and #25. Pt is off bilateral wrist restraints. Calm, alert and oriented to self only. Denies pain and nausea. Recognized son but dont know his name. Cooperates with cares. Denies pain. On 02 at 2 lpm nasal cannula. Pt started removing 02 cannula and pulse oximetry. Dr. Ulloa informed that pt is refusing respiratory monitoring but is ok with 02 sats spot checks. Pt 's orientation improved after midnight but would still not use the call light. Bed alarm on. Had the urge to void but denies pain and feeling of fullness. Poor intake. MD updated. IV antibiotic given. BG monitored overnight.  On continuous tele monitor. Voided twice overnight. Repeat bladder scan this am is 136 ml. Vital signs stable on room air. Will continue to monitor and follow plan of care

## 2021-11-27 LAB
BACTERIA ASPIRATE CULT: ABNORMAL

## 2021-11-28 LAB
BACTERIA BLD CULT: NO GROWTH
BACTERIA BLD CULT: NO GROWTH

## 2021-11-29 LAB
BACTERIA ASPIRATE CULT: ABNORMAL
BACTERIA ASPIRATE CULT: ABNORMAL
PATH REPORT.COMMENTS IMP SPEC: NORMAL
PATH REPORT.COMMENTS IMP SPEC: NORMAL
PATH REPORT.FINAL DX SPEC: NORMAL
PATH REPORT.GROSS SPEC: NORMAL
PATH REPORT.MICROSCOPIC SPEC OTHER STN: NORMAL
PATH REPORT.RELEVANT HX SPEC: NORMAL
PHOTO IMAGE: NORMAL

## 2021-12-02 DIAGNOSIS — R22.0 MANDIBULAR SWELLING: Primary | ICD-10-CM

## 2021-12-02 DIAGNOSIS — R22.0 MOUTH SWELLING: ICD-10-CM

## 2021-12-02 RX ORDER — AMOXICILLIN 500 MG/1
500 CAPSULE ORAL 3 TIMES DAILY
Qty: 90 CAPSULE | Refills: 0 | Status: SHIPPED | OUTPATIENT
Start: 2021-12-02 | End: 2021-12-09

## 2021-12-07 ASSESSMENT — ENCOUNTER SYMPTOMS
MYALGIAS: 0
DIZZINESS: 1
SPEECH CHANGE: 0
INSOMNIA: 0
MUSCLE CRAMPS: 0
SEIZURES: 0
SORE THROAT: 1
DECREASED CONCENTRATION: 1
BACK PAIN: 0
NERVOUS/ANXIOUS: 0
PANIC: 0
NECK MASS: 0
DEPRESSION: 0
HEADACHES: 0
ARTHRALGIAS: 1
HOARSE VOICE: 0
TINGLING: 0
WEAKNESS: 0
SMELL DISTURBANCE: 0
DIFFICULTY URINATING: 0
TASTE DISTURBANCE: 0
LOSS OF CONSCIOUSNESS: 0
NUMBNESS: 0
STIFFNESS: 1
SINUS PAIN: 0
NECK PAIN: 0
JOINT SWELLING: 0
DYSURIA: 0
MUSCLE WEAKNESS: 0
DISTURBANCES IN COORDINATION: 0
PARALYSIS: 0
MEMORY LOSS: 1
FLANK PAIN: 0
HEMATURIA: 0
TREMORS: 0
TROUBLE SWALLOWING: 1

## 2021-12-09 ENCOUNTER — OFFICE VISIT (OUTPATIENT)
Dept: INFECTIOUS DISEASES | Facility: CLINIC | Age: 86
End: 2021-12-09
Attending: INTERNAL MEDICINE
Payer: COMMERCIAL

## 2021-12-09 ENCOUNTER — LAB (OUTPATIENT)
Dept: LAB | Facility: CLINIC | Age: 86
End: 2021-12-09
Attending: INTERNAL MEDICINE
Payer: COMMERCIAL

## 2021-12-09 VITALS
OXYGEN SATURATION: 99 % | SYSTOLIC BLOOD PRESSURE: 169 MMHG | HEART RATE: 75 BPM | WEIGHT: 139.7 LBS | DIASTOLIC BLOOD PRESSURE: 73 MMHG | TEMPERATURE: 97.5 F

## 2021-12-09 DIAGNOSIS — M27.2 OSTEOMYELITIS OF JAW: ICD-10-CM

## 2021-12-09 DIAGNOSIS — R22.0 MOUTH SWELLING: ICD-10-CM

## 2021-12-09 DIAGNOSIS — M27.2 OSTEOMYELITIS OF JAW: Primary | ICD-10-CM

## 2021-12-09 PROBLEM — E11.9 DIABETES MELLITUS, TYPE 2 (H): Status: ACTIVE | Noted: 2021-12-09

## 2021-12-09 LAB
BASOPHILS # BLD AUTO: 0.1 10E3/UL (ref 0–0.2)
BASOPHILS NFR BLD AUTO: 1 %
CRP SERPL-MCNC: <2.9 MG/L (ref 0–8)
EOSINOPHIL # BLD AUTO: 0.6 10E3/UL (ref 0–0.7)
EOSINOPHIL NFR BLD AUTO: 5 %
ERYTHROCYTE [DISTWIDTH] IN BLOOD BY AUTOMATED COUNT: 13.6 % (ref 10–15)
HCT VFR BLD AUTO: 35.4 % (ref 40–53)
HGB BLD-MCNC: 11.4 G/DL (ref 13.3–17.7)
IMM GRANULOCYTES # BLD: 0 10E3/UL
IMM GRANULOCYTES NFR BLD: 0 %
LYMPHOCYTES # BLD AUTO: 1.3 10E3/UL (ref 0.8–5.3)
LYMPHOCYTES NFR BLD AUTO: 12 %
MCH RBC QN AUTO: 30.1 PG (ref 26.5–33)
MCHC RBC AUTO-ENTMCNC: 32.2 G/DL (ref 31.5–36.5)
MCV RBC AUTO: 93 FL (ref 78–100)
MONOCYTES # BLD AUTO: 0.8 10E3/UL (ref 0–1.3)
MONOCYTES NFR BLD AUTO: 7 %
NEUTROPHILS # BLD AUTO: 8.4 10E3/UL (ref 1.6–8.3)
NEUTROPHILS NFR BLD AUTO: 75 %
NRBC # BLD AUTO: 0 10E3/UL
NRBC BLD AUTO-RTO: 0 /100
PLATELET # BLD AUTO: 402 10E3/UL (ref 150–450)
RBC # BLD AUTO: 3.79 10E6/UL (ref 4.4–5.9)
WBC # BLD AUTO: 11.2 10E3/UL (ref 4–11)

## 2021-12-09 PROCEDURE — 99204 OFFICE O/P NEW MOD 45 MIN: CPT | Performed by: INTERNAL MEDICINE

## 2021-12-09 PROCEDURE — 36415 COLL VENOUS BLD VENIPUNCTURE: CPT | Performed by: PATHOLOGY

## 2021-12-09 PROCEDURE — 85025 COMPLETE CBC W/AUTO DIFF WBC: CPT | Performed by: PATHOLOGY

## 2021-12-09 PROCEDURE — 86140 C-REACTIVE PROTEIN: CPT | Performed by: PATHOLOGY

## 2021-12-09 RX ORDER — AMOXICILLIN 500 MG/1
500 CAPSULE ORAL 3 TIMES DAILY
Qty: 90 CAPSULE | Refills: 3 | Status: SHIPPED | OUTPATIENT
Start: 2021-12-09

## 2021-12-09 ASSESSMENT — PAIN SCALES - GENERAL: PAINLEVEL: NO PAIN (0)

## 2021-12-09 NOTE — PROGRESS NOTES
[facility]  New Patient Visit  12/9/2021     Chief Complaint:  Cervicofacial Actinomyces    HPI:  Frank Doss is a 89 year old male with recent recurrent dental infection and underlying osteomyelitis. Cultures returned after hospital discharge with Actinomyces. He is currently doing relatively well on Augmentin and has not had significant GI upset with this medication. He feels his jaw is healing relatively well. No fevers.     ROS: Complete 12-point ROS is negative except as noted above.    Past Medical History:  No past medical history on file.    Past Surgical History:  Past Surgical History:   Procedure Laterality Date     EXTRACTION(S) DENTAL N/A 11/24/2021    Procedure: EXTRACTION, TEETH #24, #25;  Surgeon: Dimitry Caballero DDS;  Location: [facility] OR     IRRIGATION AND DEBRIDEMENT MANDIBLE, COMBINED N/A 11/24/2021    Procedure: IRRIGATION AND DEBRIDEMENT, MANDIBLE;  Surgeon: Dimitry Caballero DDS;  Location: [facility] OR       Social History:  Social History     Socioeconomic History     Marital status:      Spouse name: Not on file     Number of children: Not on file     Years of education: Not on file     Highest education level: Not on file   Occupational History     Not on file   Tobacco Use     Smoking status: Not on file     Smokeless tobacco: Not on file   Substance and Sexual Activity     Alcohol use: Not on file     Drug use: Not on file     Sexual activity: Not on file   Other Topics Concern     Not on file   Social History Narrative     Not on file     Social Determinants of Health     Financial Resource Strain: Not on file   Food Insecurity: Not on file   Transportation Needs: Not on file   Physical Activity: Not on file   Stress: Not on file   Social Connections: Not on file   Intimate Partner Violence: Not on file   Housing Stability: Not on file       Family Medical History:  Reviewed and non-contributory.  Allergies:   No Known Allergies    Medications:  Current Outpatient Medications    Medication Sig Dispense Refill     amoxicillin (AMOXIL) 500 MG capsule Take 1 capsule (500 mg) by mouth 3 times daily 90 capsule 0     aspirin 81 MG EC tablet Take 81 mg by mouth daily       chlorhexidine (PERIDEX) 0.12 % solution Swish and spit 15 mLs in mouth 2 times daily 1893 mL 0     glyBURIDE (DIABETA /MICRONASE) 5 MG tablet Take 2 tablets (10 mg) by mouth once daily.       metFORMIN (GLUCOPHAGE) 500 MG tablet Take 500 mg by mouth 2 times daily (with meals)       metoprolol succinate ER (TOPROL-XL) 100 MG 24 hr tablet Take 50 mg by mouth daily        simvastatin (ZOCOR) 40 MG tablet TAKE 1 TABLET BY MOUTH EVERY NIGHT AT BEDTIME       vitamin B-12 (CYANOCOBALAMIN) 1000 MCG tablet Take 1 tablet by mouth daily       warfarin ANTICOAGULANT (COUMADIN) 5 MG tablet TAKE 1/2 TO 1 TABLET BY MOUTH DAILY AS DIRECTED PER INR CLINIC         Immunizations:  Immunization History   Administered Date(s) Administered     COVID-19,PF,Pfizer (12+ Yrs) 02/28/2021, 03/21/2021, 09/28/2021       Exam:  B/P: 169/73, T: 97.5, P: 75, R: Data Unavailable, Weight: 139 lbs 11.2 oz  Gen: Alert and in no distress.   Psych: Normal affect. Alert and oriented.   HEENT: PERRL. No icterus. Oropharynx pink and moist without lesions.   Neck: No lymphadenopathy.   CV: Regular rate and rhythm without m/r/g.   Chest: Clear to auscultation bilaterally without wheezes or crackles.   Abdomen: Soft, non-distended. Non-tender. Normal bowel sounds.   Extremities: Warm and well perfused.   Skin: No rashes or lesions noted.     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 He is improving. We will plan for a 3+ month course of amoxicillin to treat his Actinomyces and check CRP and CBC with diff today to ensure those are improving. If they aren't he may need a broader spectrum antibiotic for the firs 6 weeks of therapy.     Plan:   1. Amoxicillin 500 mg PO TID for 3+ months  2. Check CBC with diff and CRP today  3. Return to clinic in 3 months    Sanna Quiroz MD  Infectious Diseases

## 2022-01-15 ENCOUNTER — HEALTH MAINTENANCE LETTER (OUTPATIENT)
Age: 87
End: 2022-01-15

## 2022-03-12 ENCOUNTER — HEALTH MAINTENANCE LETTER (OUTPATIENT)
Age: 87
End: 2022-03-12

## 2022-07-02 ENCOUNTER — HEALTH MAINTENANCE LETTER (OUTPATIENT)
Age: 87
End: 2022-07-02

## 2023-04-23 ENCOUNTER — HEALTH MAINTENANCE LETTER (OUTPATIENT)
Age: 88
End: 2023-04-23

## 2023-09-23 ENCOUNTER — HEALTH MAINTENANCE LETTER (OUTPATIENT)
Age: 88
End: 2023-09-23

## 2024-02-10 ENCOUNTER — HEALTH MAINTENANCE LETTER (OUTPATIENT)
Age: 89
End: 2024-02-10

## 2024-06-29 ENCOUNTER — HEALTH MAINTENANCE LETTER (OUTPATIENT)
Age: 89
End: 2024-06-29

## 2024-11-16 ENCOUNTER — HEALTH MAINTENANCE LETTER (OUTPATIENT)
Age: 89
End: 2024-11-16

## (undated) DEVICE — SUCTION MANIFOLD NEPTUNE 2 SYS 4 PORT 0702-020-000

## (undated) DEVICE — TOOTHBRUSH ADULT NON STERILE MDS136850

## (undated) DEVICE — ESU ELEC NDL 1" COATED/INSULATED E1465

## (undated) DEVICE — DRSG GAUZE 4X4" TRAY 6939

## (undated) DEVICE — DRSG JAWBRA  95

## (undated) DEVICE — LINEN TOWEL PACK X5 5464

## (undated) DEVICE — SPONGE BALL KERLIX ROUND XL W/O STRING LATEX 4935

## (undated) DEVICE — SOL NACL 0.9% IRRIG 1000ML BOTTLE 2F7124

## (undated) DEVICE — SU CHROMIC 3-0 FS-2 27" 636

## (undated) DEVICE — SUCTION TIP YANKAUER W/O VENT K86

## (undated) DEVICE — STRAP UNIVERSAL POSITIONING 2-PIECE 4X47X76" 91-287

## (undated) DEVICE — ESU GROUND PAD ADULT W/CORD E7507

## (undated) DEVICE — PREP POVIDONE IODINE SOLUTION 10% 4OZ

## (undated) DEVICE — LINEN TOWEL PACK X6 WHITE 5487

## (undated) DEVICE — Device

## (undated) DEVICE — SYR EAR BULB 3OZ 0035830

## (undated) DEVICE — SOL WATER IRRIG 1000ML BOTTLE 2F7114

## (undated) RX ORDER — BUPIVACAINE HYDROCHLORIDE AND EPINEPHRINE 5; 5 MG/ML; UG/ML
INJECTION, SOLUTION PERINEURAL
Status: DISPENSED
Start: 2021-11-24

## (undated) RX ORDER — HALOPERIDOL 5 MG/ML
INJECTION INTRAMUSCULAR
Status: DISPENSED
Start: 2021-11-24

## (undated) RX ORDER — CHLORHEXIDINE GLUCONATE ORAL RINSE 1.2 MG/ML
SOLUTION DENTAL
Status: DISPENSED
Start: 2021-11-24

## (undated) RX ORDER — HYDROMORPHONE HCL IN WATER/PF 6 MG/30 ML
PATIENT CONTROLLED ANALGESIA SYRINGE INTRAVENOUS
Status: DISPENSED
Start: 2021-11-24

## (undated) RX ORDER — FENTANYL CITRATE 50 UG/ML
INJECTION, SOLUTION INTRAMUSCULAR; INTRAVENOUS
Status: DISPENSED
Start: 2021-11-24

## (undated) RX ORDER — HYDRALAZINE HYDROCHLORIDE 20 MG/ML
INJECTION INTRAMUSCULAR; INTRAVENOUS
Status: DISPENSED
Start: 2021-11-24